# Patient Record
Sex: FEMALE | Race: WHITE | NOT HISPANIC OR LATINO | Employment: UNEMPLOYED | URBAN - NONMETROPOLITAN AREA
[De-identification: names, ages, dates, MRNs, and addresses within clinical notes are randomized per-mention and may not be internally consistent; named-entity substitution may affect disease eponyms.]

---

## 2023-08-03 LAB — URINE CULTURE: ABNORMAL

## 2023-08-25 LAB — URINE CULTURE: NO GROWTH

## 2023-09-09 LAB — URINE CULTURE: ABNORMAL

## 2023-09-25 LAB
APPEARANCE, URINE: NORMAL
BILIRUBIN, URINE: NEGATIVE
BLOOD, URINE: NEGATIVE
COLOR, URINE: YELLOW
GLUCOSE, URINE: NEGATIVE MG/DL
KETONES, URINE: NEGATIVE MG/DL
LEUKOCYTE ESTERASE, URINE: NEGATIVE
NITRITE, URINE: NEGATIVE
PH, URINE: 5 (ref 5–8)
PROTEIN, URINE: NEGATIVE MG/DL
SPECIFIC GRAVITY, URINE: 1.01 (ref 1–1.03)
UROBILINOGEN, URINE: <2 MG/DL (ref 0–1.9)

## 2023-09-26 LAB — URINE CULTURE: NORMAL

## 2023-10-12 ENCOUNTER — OFFICE VISIT (OUTPATIENT)
Dept: PRIMARY CARE | Facility: CLINIC | Age: 73
End: 2023-10-12
Payer: MEDICARE

## 2023-10-12 VITALS
WEIGHT: 116 LBS | HEART RATE: 78 BPM | BODY MASS INDEX: 20.55 KG/M2 | TEMPERATURE: 97.1 F | SYSTOLIC BLOOD PRESSURE: 138 MMHG | DIASTOLIC BLOOD PRESSURE: 80 MMHG | OXYGEN SATURATION: 98 %

## 2023-10-12 DIAGNOSIS — M80.08XA AGE-RELATED OSTEOPOROSIS WITH CURRENT PATHOLOGICAL FRACTURE, VERTEBRA(E), INITIAL ENCOUNTER FOR FRACTURE (MULTI): ICD-10-CM

## 2023-10-12 DIAGNOSIS — I10 PRIMARY HYPERTENSION: ICD-10-CM

## 2023-10-12 DIAGNOSIS — F41.9 ANXIETY: ICD-10-CM

## 2023-10-12 DIAGNOSIS — R79.9 ABNORMAL FINDING OF BLOOD CHEMISTRY, UNSPECIFIED: ICD-10-CM

## 2023-10-12 DIAGNOSIS — Z12.31 SCREENING MAMMOGRAM, ENCOUNTER FOR: ICD-10-CM

## 2023-10-12 DIAGNOSIS — Z00.00 HEALTHCARE MAINTENANCE: Primary | ICD-10-CM

## 2023-10-12 DIAGNOSIS — E55.9 VITAMIN D DEFICIENCY, UNSPECIFIED: ICD-10-CM

## 2023-10-12 DIAGNOSIS — Z13.820 SCREENING FOR OSTEOPOROSIS: ICD-10-CM

## 2023-10-12 PROBLEM — E78.5 HYPERLIPIDEMIA: Status: ACTIVE | Noted: 2023-10-12

## 2023-10-12 PROBLEM — K80.20 CHOLELITHIASIS WITHOUT OBSTRUCTION: Status: ACTIVE | Noted: 2023-10-12

## 2023-10-12 PROBLEM — N81.11 MIDLINE CYSTOCELE: Status: ACTIVE | Noted: 2023-10-12

## 2023-10-12 PROBLEM — M81.0 AGE-RELATED OSTEOPOROSIS WITHOUT CURRENT PATHOLOGICAL FRACTURE: Status: ACTIVE | Noted: 2023-10-12

## 2023-10-12 PROBLEM — M16.11 ARTHRITIS OF RIGHT HIP: Status: ACTIVE | Noted: 2023-10-12

## 2023-10-12 PROBLEM — F51.04 PSYCHOPHYSIOLOGICAL INSOMNIA: Status: ACTIVE | Noted: 2023-10-12

## 2023-10-12 PROBLEM — H81.09 LABYRINTHINE VERTIGO: Status: ACTIVE | Noted: 2017-02-02

## 2023-10-12 PROBLEM — G56.03 BILATERAL CARPAL TUNNEL SYNDROME: Status: ACTIVE | Noted: 2019-01-03

## 2023-10-12 PROBLEM — E03.8 SUBCLINICAL HYPOTHYROIDISM: Status: ACTIVE | Noted: 2023-10-12

## 2023-10-12 PROBLEM — M47.812 CERVICAL SPONDYLOSIS WITHOUT MYELOPATHY: Status: ACTIVE | Noted: 2019-01-03

## 2023-10-12 PROBLEM — R26.9 ABNORMALITY OF GAIT: Status: ACTIVE | Noted: 2017-10-09

## 2023-10-12 PROCEDURE — 80053 COMPREHEN METABOLIC PANEL: CPT

## 2023-10-12 PROCEDURE — 1036F TOBACCO NON-USER: CPT

## 2023-10-12 PROCEDURE — 80061 LIPID PANEL: CPT

## 2023-10-12 PROCEDURE — 85025 COMPLETE CBC W/AUTO DIFF WBC: CPT

## 2023-10-12 PROCEDURE — 3079F DIAST BP 80-89 MM HG: CPT

## 2023-10-12 PROCEDURE — 87389 HIV-1 AG W/HIV-1&-2 AB AG IA: CPT

## 2023-10-12 PROCEDURE — 99387 INIT PM E/M NEW PAT 65+ YRS: CPT

## 2023-10-12 PROCEDURE — 3075F SYST BP GE 130 - 139MM HG: CPT

## 2023-10-12 PROCEDURE — 1160F RVW MEDS BY RX/DR IN RCRD: CPT

## 2023-10-12 PROCEDURE — 36415 COLL VENOUS BLD VENIPUNCTURE: CPT

## 2023-10-12 PROCEDURE — 1159F MED LIST DOCD IN RCRD: CPT

## 2023-10-12 RX ORDER — OXYBUTYNIN CHLORIDE 5 MG/1
5 TABLET ORAL 2 TIMES DAILY
COMMUNITY
Start: 2010-07-14 | End: 2023-10-13

## 2023-10-12 RX ORDER — IBUPROFEN 600 MG/1
600 TABLET ORAL EVERY 6 HOURS PRN
COMMUNITY
Start: 2023-05-10 | End: 2023-11-01 | Stop reason: ALTCHOICE

## 2023-10-12 RX ORDER — LISINOPRIL 10 MG/1
10 TABLET ORAL DAILY
Qty: 90 TABLET | Refills: 1 | Status: SHIPPED | OUTPATIENT
Start: 2023-10-12 | End: 2024-04-09

## 2023-10-12 RX ORDER — BUPROPION HYDROCHLORIDE 300 MG/1
300 TABLET ORAL EVERY MORNING
Qty: 90 TABLET | Refills: 1 | Status: SHIPPED | OUTPATIENT
Start: 2023-10-12 | End: 2024-04-09

## 2023-10-12 RX ORDER — FLUTICASONE PROPIONATE 50 MCG
SPRAY, SUSPENSION (ML) NASAL
COMMUNITY
End: 2023-11-01 | Stop reason: ALTCHOICE

## 2023-10-12 RX ORDER — VIT A/VIT C/VIT E/ZINC/COPPER 4296-226
CAPSULE ORAL
COMMUNITY

## 2023-10-12 RX ORDER — LISINOPRIL 10 MG/1
10 TABLET ORAL DAILY
COMMUNITY
Start: 2018-09-18 | End: 2023-10-12 | Stop reason: SDUPTHER

## 2023-10-12 RX ORDER — OXYCODONE HYDROCHLORIDE 5 MG/1
5 TABLET ORAL EVERY 4 HOURS PRN
COMMUNITY
Start: 2023-05-10 | End: 2023-11-01 | Stop reason: ALTCHOICE

## 2023-10-12 RX ORDER — PSYLLIUM HUSK 100 %
POWDER (GRAM) MISCELLANEOUS
COMMUNITY
End: 2023-11-01 | Stop reason: ALTCHOICE

## 2023-10-12 RX ORDER — CLOBETASOL PROPIONATE 0.46 MG/ML
SOLUTION TOPICAL
COMMUNITY
Start: 2016-12-02 | End: 2023-11-01 | Stop reason: ALTCHOICE

## 2023-10-12 RX ORDER — ESTRADIOL 0.1 MG/G
CREAM VAGINAL
COMMUNITY
Start: 2018-09-18 | End: 2023-11-01 | Stop reason: ALTCHOICE

## 2023-10-12 RX ORDER — NITROFURANTOIN 25; 75 MG/1; MG/1
100 CAPSULE ORAL EVERY 12 HOURS
COMMUNITY
Start: 2023-07-11 | End: 2023-10-13 | Stop reason: ALTCHOICE

## 2023-10-12 RX ORDER — NAPROXEN SODIUM 220 MG
220 TABLET ORAL EVERY 12 HOURS PRN
COMMUNITY
Start: 2017-01-26

## 2023-10-12 RX ORDER — IBUPROFEN 100 MG/5ML
1000 SUSPENSION, ORAL (FINAL DOSE FORM) ORAL
COMMUNITY

## 2023-10-12 RX ORDER — NAPROXEN SODIUM 220 MG/1
TABLET, FILM COATED ORAL
COMMUNITY
End: 2023-11-01 | Stop reason: ALTCHOICE

## 2023-10-12 RX ORDER — FLUCONAZOLE 150 MG/1
TABLET ORAL
COMMUNITY
Start: 2023-07-28 | End: 2023-10-13 | Stop reason: ALTCHOICE

## 2023-10-12 RX ORDER — MULTIVITAMIN
1 TABLET ORAL DAILY
COMMUNITY
Start: 2007-12-19

## 2023-10-12 RX ORDER — MECLIZINE HYDROCHLORIDE 25 MG/1
12.5-25 TABLET ORAL EVERY 6 HOURS PRN
COMMUNITY
Start: 2017-11-09 | End: 2023-11-01 | Stop reason: ALTCHOICE

## 2023-10-12 RX ORDER — LEVOFLOXACIN 750 MG/1
750 TABLET ORAL DAILY
COMMUNITY
Start: 2023-07-28 | End: 2023-10-13 | Stop reason: ALTCHOICE

## 2023-10-12 RX ORDER — BUPROPION HYDROCHLORIDE 300 MG/1
300 TABLET ORAL EVERY MORNING
COMMUNITY
Start: 2018-09-18 | End: 2023-10-12 | Stop reason: SDUPTHER

## 2023-10-12 ASSESSMENT — ENCOUNTER SYMPTOMS
ALLERGIC/IMMUNOLOGIC NEGATIVE: 1
MUSCULOSKELETAL NEGATIVE: 1
DIZZINESS: 0
UNEXPECTED WEIGHT CHANGE: 0
PSYCHIATRIC NEGATIVE: 1
HEADACHES: 0
CARDIOVASCULAR NEGATIVE: 1
RESPIRATORY NEGATIVE: 1
FEVER: 0
SHORTNESS OF BREATH: 0
ACTIVITY CHANGE: 0
ABDOMINAL PAIN: 0
WEAKNESS: 0
FATIGUE: 0
GASTROINTESTINAL NEGATIVE: 1
ENDOCRINE NEGATIVE: 1

## 2023-10-12 NOTE — PATIENT INSTRUCTIONS
Refills sent  See yearly    Mammogram, Bone Density screening  Colonoscopy in the coming years    Thank you for coming in today, if any questions or concerns arise, please call my office.   ALICE Mckeon-CNP

## 2023-10-12 NOTE — PROGRESS NOTES
Subjective   Patient ID: Sandra Jaime is a 73 y.o. female who presents for [facility] (Sandra is here to [facility]. No concerns. ).  Yearly wellness, establish care    Medication refills, currently on lisinopril and wellbutrin  She is caring for her  [place], currently in hospice care    Stressors in life, BP is stable.         Vitals:    10/12/23 1405   BP: 138/80   Pulse: 78   Temp: 36.2 °C (97.1 °F)   SpO2: 98%       Review of Systems   Constitutional:  Negative for activity change, fatigue, fever and unexpected weight change.   HENT: Negative.     Respiratory: Negative.  Negative for shortness of breath.    Cardiovascular: Negative.  Negative for chest pain.   Gastrointestinal: Negative.  Negative for abdominal pain.   Endocrine: Negative.    Musculoskeletal: Negative.    Skin: Negative.    Allergic/Immunologic: Negative.    Neurological:  Negative for dizziness, weakness and headaches.   Psychiatric/Behavioral: Negative.         Objective   Physical Exam  Vitals and nursing note reviewed.   Constitutional:       Appearance: Normal appearance.   HENT:      Head: Normocephalic.      Mouth/Throat:      Mouth: Mucous membranes are moist.   Cardiovascular:      Rate and Rhythm: Normal rate and regular rhythm.      Pulses: Normal pulses.      Heart sounds: Normal heart sounds. No murmur heard.     No friction rub. No gallop.   Pulmonary:      Effort: Pulmonary effort is normal. No respiratory distress.      Breath sounds: Normal breath sounds. No wheezing.   Abdominal:      General: Bowel sounds are normal. There is no distension.      Palpations: Abdomen is soft.      Tenderness: There is no abdominal tenderness.   Musculoskeletal:         General: No deformity. Normal range of motion.   Skin:     General: Skin is warm and dry.      Capillary Refill: Capillary refill takes less than 2 seconds.   Neurological:      General: No focal deficit present.      Mental Status: She is alert and oriented to  person, place, and time.   Psychiatric:         Mood and Affect: Mood normal.         Assessment/Plan   Problem List Items Addressed This Visit    None  Visit Diagnoses       Healthcare maintenance    -  Primary    Relevant Orders    Hepatitis C Antibody    HIV 1/2 Antigen/Antibody Screen with Reflex to Confirmation    Vitamin D 25-Hydroxy,Total (for eval of Vitamin D levels)    CBC and Auto Differential    Comprehensive Metabolic Panel    Lipid Panel    TSH with reflex to Free T4 if abnormal    Screening mammogram, encounter for        Relevant Orders    BI mammo bilateral screening tomosynthesis    Primary hypertension        Relevant Medications    lisinopril 10 mg tablet    Anxiety        Relevant Medications    buPROPion XL (Wellbutrin XL) 300 mg 24 hr tablet    Vitamin D deficiency, unspecified        Relevant Orders    Vitamin D 25-Hydroxy,Total (for eval of Vitamin D levels)    Abnormal finding of blood chemistry, unspecified        Relevant Orders    CBC and Auto Differential    Screening for osteoporosis        Relevant Orders    XR DEXA bone density    Age-related osteoporosis with current pathological fracture, vertebra(e), initial encounter for fracture (CMS/[facility])        Relevant Orders    XR DEXA bone density                 Thank you for coming in today, please call my office if you have any concerns or questions.     Nj JENKINS, CNP

## 2023-10-13 ENCOUNTER — PROCEDURE VISIT (OUTPATIENT)
Dept: UROLOGY | Facility: CLINIC | Age: 73
End: 2023-10-13
Payer: MEDICARE

## 2023-10-13 DIAGNOSIS — N81.11 MIDLINE CYSTOCELE: ICD-10-CM

## 2023-10-13 DIAGNOSIS — N39.41 URGE URINARY INCONTINENCE: Primary | ICD-10-CM

## 2023-10-13 DIAGNOSIS — N81.89 PELVIC FLOOR WEAKNESS: ICD-10-CM

## 2023-10-13 LAB
ALBUMIN SERPL BCP-MCNC: 4.5 G/DL (ref 3.4–5)
ALP SERPL-CCNC: 96 U/L (ref 33–136)
ALT SERPL W P-5'-P-CCNC: 18 U/L (ref 7–45)
ANION GAP SERPL CALC-SCNC: 13 MMOL/L (ref 10–20)
AST SERPL W P-5'-P-CCNC: 19 U/L (ref 9–39)
BASOPHILS # BLD AUTO: 0.06 X10*3/UL (ref 0–0.1)
BASOPHILS NFR BLD AUTO: 1 %
BILIRUB SERPL-MCNC: 0.5 MG/DL (ref 0–1.2)
BUN SERPL-MCNC: 23 MG/DL (ref 6–23)
CALCIUM SERPL-MCNC: 10.2 MG/DL (ref 8.6–10.6)
CHLORIDE SERPL-SCNC: 103 MMOL/L (ref 98–107)
CHOLEST SERPL-MCNC: 203 MG/DL (ref 0–199)
CHOLESTEROL/HDL RATIO: 3.6
CO2 SERPL-SCNC: 30 MMOL/L (ref 21–32)
CREAT SERPL-MCNC: 0.83 MG/DL (ref 0.5–1.05)
EOSINOPHIL # BLD AUTO: 0.06 X10*3/UL (ref 0–0.4)
EOSINOPHIL NFR BLD AUTO: 1 %
ERYTHROCYTE [DISTWIDTH] IN BLOOD BY AUTOMATED COUNT: 12 % (ref 11.5–14.5)
GFR SERPL CREATININE-BSD FRML MDRD: 75 ML/MIN/1.73M*2
GLUCOSE SERPL-MCNC: 90 MG/DL (ref 74–99)
HCT VFR BLD AUTO: 42.5 % (ref 36–46)
HDLC SERPL-MCNC: 56.3 MG/DL
HGB BLD-MCNC: 13.6 G/DL (ref 12–16)
HIV 1+2 AB+HIV1 P24 AG SERPL QL IA: NONREACTIVE
IMM GRANULOCYTES # BLD AUTO: 0.01 X10*3/UL (ref 0–0.5)
IMM GRANULOCYTES NFR BLD AUTO: 0.2 % (ref 0–0.9)
LDLC SERPL CALC-MCNC: 112 MG/DL
LYMPHOCYTES # BLD AUTO: 2.01 X10*3/UL (ref 0.8–3)
LYMPHOCYTES NFR BLD AUTO: 32.3 %
MCH RBC QN AUTO: 30 PG (ref 26–34)
MCHC RBC AUTO-ENTMCNC: 32 G/DL (ref 32–36)
MCV RBC AUTO: 94 FL (ref 80–100)
MONOCYTES # BLD AUTO: 0.57 X10*3/UL (ref 0.05–0.8)
MONOCYTES NFR BLD AUTO: 9.1 %
NEUTROPHILS # BLD AUTO: 3.52 X10*3/UL (ref 1.6–5.5)
NEUTROPHILS NFR BLD AUTO: 56.4 %
NON HDL CHOLESTEROL: 147 MG/DL (ref 0–149)
NRBC BLD-RTO: 0 /100 WBCS (ref 0–0)
PLATELET # BLD AUTO: 266 X10*3/UL (ref 150–450)
PMV BLD AUTO: 9.7 FL (ref 7.5–11.5)
POTASSIUM SERPL-SCNC: 4.8 MMOL/L (ref 3.5–5.3)
PROT SERPL-MCNC: 6.8 G/DL (ref 6.4–8.2)
RBC # BLD AUTO: 4.54 X10*6/UL (ref 4–5.2)
SODIUM SERPL-SCNC: 141 MMOL/L (ref 136–145)
TRIGL SERPL-MCNC: 173 MG/DL (ref 0–149)
VLDL: 35 MG/DL (ref 0–40)
WBC # BLD AUTO: 6.2 X10*3/UL (ref 4.4–11.3)

## 2023-10-13 PROCEDURE — 64561 IMPLANT NEUROELECTRODES: CPT | Performed by: OBSTETRICS & GYNECOLOGY

## 2023-10-13 PROCEDURE — 99213 OFFICE O/P EST LOW 20 MIN: CPT | Performed by: OBSTETRICS & GYNECOLOGY

## 2023-10-13 PROCEDURE — 64561 IMPLANT NEUROELECTRODES: CPT | Mod: 50,MUE | Performed by: OBSTETRICS & GYNECOLOGY

## 2023-10-13 NOTE — PROGRESS NOTES
Subjective   Patient ID: Sandra Jaime is a 73 y.o. 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    She has no other complaints.         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     The patient completed 7 days of Levaquin for her most recent UTI, however she continues to note worsening urinary urgency and frequency. She notes 2-3 episodes of nocturia but denies any enuresis. She voids every 1-3 hours during the day with terminal incontinence on the way to the bathroom. She states she does not have a sensation of complete bladder emptying and has to double void. She denies leaking on laughing, coughing and sneezing. She also notes abdominal pain since this morning.      She denies any vaginal complaints, no abnormal vaginal bleeding or discharge. She is utilizing the fluconazole for her yeast vaginitis.     She denies any bowel related complaints, no fecal or flatal incontinence.     She has no other complaints.        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 She reports she did take the antibiotic which was prescribed after her most recent Botox injection treatment. Patient does have urgency and frequency bladder symptoms with a strong urine odor.      She denies any bowel related complaints, no fecal or flatal incontinence.     She has no other complaints.        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 She has stopped taking her Oxybutynin.      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     The patient presents to discuss her UDS test results, which showed that she does leak with laughing, coughing and sneezing but the leak is more associated with spasticity than the anatomy. She does continue to note bothersome urinary incontinence associated with moving, lifting. She denies any UTI like symptoms.  Third line therapy options including intradetrusor Botox were discussed.     She is heading to [facility] in approximately a month.      She denies any bowel related complaints, no fecal or flatal incontinence.     She denies any vaginal complaints, no abnormal vaginal bleeding or discharge.     She has no other complaints.      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     Patient denies any vaginal complaints. She denies any abnormal vaginal bleeding or discharge.     Unfortunately she does continue to note bothersome urinary incontinence associated with moving, lifting. She denies any significant urge incontinence. She denies any nocturia.     She has no other complaints.     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     The patient presents with complaints of urinary incontinence, she states she leaks on movement and constantly wet all the time and is utilizing pads to avoid accidents. She notes leaking on laughing, cough or sneezing.She denies any UTI like symptoms.      She denies any vaginal complaints, she continues to note some vaginal spotting but denies any abnormal vaginal discharge.      She denies any bowel related complaints, no fecal or flatal incontinence.     She has no other complaints.     From previous note  72-year-old patient presenting as a referral from Dr. Boss with complaints of urinary frequency, urgency and pelvic organ  prolapse.      The patient notes a bulge, she is noting its worsening for the past two weeks. She is not sexually active but denies any vaginal complaints, no abnormal vaginal bleeding or discharge. She underwent hysterectomy due to fibroids a few years ago.      She also reports of l urinary urgency and frequency for the past few weeks, she notes 2-3 episodes of nocturia but denies enuresis. She notes daytime urgency and frequency every 1-2 hours. She has been on oxybutynin for many years, immediate release, and has noted benefits with her lower urinary tract symptoms until roughly 2 weeks ago.. She is experiencing constipation with this medication. She denies leaking on laughing, cough or sneezing. She denies any urge related incontinence complaints. She has had UTI s in the past diagnosed and treated through [facility]. She denies any history of nephrolithiasis, gross hematuria or chronic recurrent UTIs.      She has constipation and utilizes psyllium husks on an as needed basis. She denies any fecal or flatal incontinence.     She has no other complaints.    Review of Systems  Constitutional: No fever, No chills and No fatigue.   Eyes: No vision problems and No dryness of the eyes.   ENT: No dry mouth, No hearing loss and No nosebleeds.   Cardiovascular: No chest pain, No palpitations and No orthopnea.   Respiratory: No shortness of breath, No cough and No wheezing.   Gastrointestinal: No abdominal pain, No constipation, No nausea, No diarrhea, No vomiting and No melena.   Genitourinary: As noted in HPI.   Musculoskeletal: No back pain, No myalgias, No muscle weakness, No joint swelling and No leg edema.   Integumentary: No rashes, No skin lesion and No itching.   Neurological: No headache, No numbness and No dizziness.   Psychiatric: No sleep disturbances, No anxiety and No depression.   Endocrine: No hot flashes, No loss of hair and No hirsutism.   Hematologic/Lymphatic: No swollen glands, No tendency for  "easy bleeding and No tendency for easy bruising.   All other systems have been reviewed and are negative for complaint.        Objective   Physical Exam    FPMRS Procedure: Interstim PNE.     The patient was identified and placed in prone position. Pillows were placed under lower abdomen to flatten the sacrum and under the shins to allow the toes to dangle freely. A ground pad was placed on the bottom of the patient’s foot and the long test stimulation cable was connected to the ground pad and the external test stimulator. The patient was prepped in usual sterile fashion. The sciatic notches and sacral midline were identified via palpation. The level of S3 was identified by measuring 9cm from the tip of the coccyx.     Local injection of was administered at the foramen needle entry point located 2cm lateral to the sacral midline and 2cm cephalad of the sciatic notch level. A 3.5\" foramen needle was introduced at an approximate 60 degree angle, feeling for the foraminal margins until S3 was identified. Proper needle position was confirmed by the patient identifying location of stimulation sensation; and direct observation of the lifting of the perineum or “bellowing,” and plantar flexion of the great toe utilizing the mini-hook patient cable and the external test stimulator.     The foramen needle stylet was removed and a percutaneous lead was inserted to proper depth identified by markers on the lead. The lead was tested by connecting the mini-hook patient cable to the proximal lead electrode and the external test stimulator. Upon response confirmation, the foramen needle was removed by sliding over the percutaneous lead. The foramen needle and lead stylet were removed while securing lead location. Retesting to confirm appropriate lead response was completed.   The above procedure was performed again on the contralateral side.     The leads were connected to the patient cable. Patient was cleaned and external " cabling was secured by covering with transparent dressing. Estimated blood loss was minimal.    Post Procedure:     The patient was programmed with the external test stimulator to optimum sensation via the lead and provided utilization instructions prior to discharge. Patient will complete a voiding diary during testing period to help document results of this procedure.      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     1. Uncomplicated 100 units Botox 7/10/2023. Unfortunately this was complicated by a UTI on office UA 7/28/2023. She has not noted any significant benefits with the Botox after appropriate treatment of her UTI. The patient has noted significant urinary incontinence following her surgery. We have previously discussed the patient's urodynamics noting positive stress urinary incontinence with a leak point pressure of 60 cm of water but with significant detrusor overactivity and spasticity. She emptied to completion and there was no evidence of detrusor sphincter dyssynergia. We have previously discussed these findings and the mixed nature of her disease. We discussed that it appears that her urge urinary incontinence is significantly worsening her stress incontinence complaints. She has stopped her oxybutynin as this was not benefiting her lower urinary tract complaints. She has not had the opportunity to follow-up with the pelvic floor physical therapist.  Uncomplicated office PNE today.    2. As above, we discussed the patient's weak and painful pelvic floor and how  this relates to her lower urinary tract and vaginal complaints. She was previously provided a referral and list of pelvic floor physical therapist. She has not had the opportunity to follow-up to date..     3. We again discussed the patient's vaginal atrophy and how this affects her lower urinary tract and vaginal complaints. We discussed the safety, efficacy, proper utilization of vaginal estrogen therapy. She will continue this 3 times a week moving forward     5. The patient is overall satisfied with her surgery. She has no vaginal complaints.     6. The patient will follow-up in 1 week to discuss her PNE.     ESTELA Harmon MD     Scribe Attestation  By signing my name below, I, Sarah Pratt attest that this documentation has been prepared under the direction and in the presence of Rashaad Harmon MD. All medical record entries made by the Scribe were at my direction or personally dictated by me. I have reviewed the chart and agree that the record accurately reflects my personal performance of the history, physical exam, discussion and plan.     Yes - the patient is able to be screened

## 2023-10-20 ENCOUNTER — OFFICE VISIT (OUTPATIENT)
Dept: UROLOGY | Facility: CLINIC | Age: 73
End: 2023-10-20
Payer: MEDICARE

## 2023-10-20 DIAGNOSIS — N81.89 PELVIC FLOOR WEAKNESS: ICD-10-CM

## 2023-10-20 DIAGNOSIS — N39.41 URGE URINARY INCONTINENCE: Primary | ICD-10-CM

## 2023-10-20 DIAGNOSIS — R10.2 PELVIC PAIN: ICD-10-CM

## 2023-10-20 PROCEDURE — 99213 OFFICE O/P EST LOW 20 MIN: CPT | Performed by: OBSTETRICS & GYNECOLOGY

## 2023-10-20 PROCEDURE — 1159F MED LIST DOCD IN RCRD: CPT | Performed by: OBSTETRICS & GYNECOLOGY

## 2023-10-20 PROCEDURE — 1160F RVW MEDS BY RX/DR IN RCRD: CPT | Performed by: OBSTETRICS & GYNECOLOGY

## 2023-10-20 PROCEDURE — 1036F TOBACCO NON-USER: CPT | Performed by: OBSTETRICS & GYNECOLOGY

## 2023-10-20 PROCEDURE — 99024 POSTOP FOLLOW-UP VISIT: CPT | Performed by: OBSTETRICS & GYNECOLOGY

## 2023-10-20 RX ORDER — SODIUM CHLORIDE, SODIUM LACTATE, POTASSIUM CHLORIDE, CALCIUM CHLORIDE 600; 310; 30; 20 MG/100ML; MG/100ML; MG/100ML; MG/100ML
100 INJECTION, SOLUTION INTRAVENOUS CONTINUOUS
Status: CANCELLED | OUTPATIENT
Start: 2023-10-20

## 2023-10-20 NOTE — H&P (VIEW-ONLY)
Subjective   Patient ID: Sandra Jaime is a 73 y.o. 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    The patient did note significant benefits with the office PNE.  She noted decreased urgency and frequency and incontinence episodes.  She was overall very satisfied with the trial and wishes to proceed with combined stage I/II InterStim.    She is under a great amount of stress as her  is in hospice.     She denies any vaginal complaints, no abnormal bleeding or discharge.     She denies any bowel related complaints, no fecal or flatal incontinence.    She has no other complaints.    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     She has no other complaints.          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     The patient completed 7 days of Levaquin for her most recent UTI, however she continues to note worsening urinary urgency and frequency. She notes 2-3 episodes of nocturia but denies any enuresis. She voids every 1-3 hours during the day with terminal incontinence on the way to the bathroom. She states she does not have a sensation of complete bladder emptying and has to double void. She denies leaking on laughing, coughing and sneezing. She also notes abdominal pain since this morning.      She denies any vaginal complaints, no abnormal vaginal bleeding or discharge. She is utilizing the fluconazole for her yeast vaginitis.     She denies any bowel related complaints, no fecal or flatal incontinence.     She has no other complaints.        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 She reports she did take the antibiotic which was prescribed after her most recent Botox injection treatment. Patient does have urgency and frequency bladder symptoms with a strong urine odor.      She denies any bowel related complaints, no fecal or flatal incontinence.     She has no other complaints.        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 She has stopped taking her Oxybutynin.      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     The patient presents to discuss her UDS test results, which showed that she does leak with laughing, coughing and sneezing but the leak is more associated with spasticity than the anatomy. She does continue to note bothersome urinary incontinence associated with moving, lifting. She denies any UTI like symptoms. Third line therapy options including intradetrusor Botox were discussed.     She is heading to [facility] in approximately a month.      She denies any bowel related complaints, no fecal or flatal incontinence.     She denies any vaginal complaints, no abnormal vaginal bleeding or discharge.     She has no other complaints.      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     Patient denies any vaginal complaints. She denies any abnormal vaginal bleeding or discharge.     Unfortunately she does continue to note bothersome urinary incontinence associated with moving, lifting. She denies any significant urge incontinence. She denies any nocturia.      She has no other complaints.     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     The patient presents with complaints of urinary incontinence, she states she leaks on movement and constantly wet all the time and is utilizing pads to avoid accidents. She notes leaking on laughing, cough or sneezing.She denies any UTI like symptoms.      She denies any vaginal complaints, she continues to note some vaginal spotting but denies any abnormal vaginal discharge.      She denies any bowel related complaints, no fecal or flatal incontinence.     She has no other complaints.     From previous note  72-year-old patient presenting as a referral from Dr. Boss with complaints of urinary frequency, urgency and pelvic organ prolapse.      The patient notes a bulge, she is noting its worsening for the past two weeks. She is not sexually active but denies any vaginal complaints, no abnormal vaginal bleeding or discharge. She underwent hysterectomy due to fibroids a few years ago.      She also reports of l urinary urgency and frequency for the past few weeks, she notes 2-3 episodes of nocturia but denies enuresis. She notes daytime urgency and frequency every 1-2 hours. She has been on oxybutynin for many years, immediate release, and has noted benefits with her lower urinary tract symptoms until roughly 2 weeks ago.. She is experiencing constipation with this medication. She denies leaking on laughing, cough or sneezing. She denies any urge related incontinence complaints. She has had UTI s in the past diagnosed and treated through [facility]. She denies any history of nephrolithiasis, gross hematuria or chronic recurrent UTIs.      She has constipation and utilizes psyllium  husks on an as needed basis. She denies any fecal or flatal incontinence.     She has no other complaints.  Review of Systems  Constitutional: No fever, No chills and No fatigue.   Eyes: No vision problems and No dryness of the eyes.   ENT: No dry mouth, No hearing loss and No nosebleeds.   Cardiovascular: No chest pain, No palpitations and No orthopnea.   Respiratory: No shortness of breath, No cough and No wheezing.   Gastrointestinal: No abdominal pain, No constipation, No nausea, No diarrhea, No vomiting and No melena.   Genitourinary: As noted in HPI.   Musculoskeletal: No back pain, No myalgias, No muscle weakness, No joint swelling and No leg edema.   Integumentary: No rashes, No skin lesion and No itching.   Neurological: No headache, No numbness and No dizziness.   Psychiatric: No sleep disturbances, No anxiety and No depression.   Endocrine: No hot flashes, No loss of hair and No hirsutism.   Hematologic/Lymphatic: No swollen glands, No tendency for easy bleeding and No tendency for easy bruising.   All other systems have been reviewed and are negative for complaint.        Objective   Physical Exam    The bilateral PNE leads were removed with gentle traction in their entirety.  No bleeding was noted or were there any signs or symptoms of infection.    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     1.  The patient did not have any significant benefits following her 100 units Botox 7/10/2023. This was complicated by a UTI on office UA 7/28/2023. She has not  noted any significant benefits with the Botox after appropriate treatment of her UTI. The patient has noted significant urinary incontinence following her surgery. We have previously discussed the patient's urodynamics noting positive stress urinary incontinence with a leak point pressure of 60 cm of water but with significant detrusor overactivity and spasticity. She emptied to completion and there was no evidence of detrusor sphincter dyssynergia. We have previously discussed these findings and the mixed nature of her disease. We discussed that it appears that her urge urinary incontinence is significantly worsening her stress incontinence complaints. She has stopped her oxybutynin as this was not benefiting her lower urinary tract complaints. She has not had the opportunity to follow-up with the pelvic floor physical therapist.  She had significant improvements in her lower urinary tract complaints following her PNE.  She strongly desires to proceed with a combined stage I and II InterStim and will be scheduled at her earliest convenience.     2. As above, we discussed the patient's weak and painful pelvic floor and how this relates to her lower urinary tract and vaginal complaints. She was previously provided a referral and list of pelvic floor physical therapist. She has not had the opportunity to follow-up to date.  She is under a great deal of stress as her  is soon to be transferred to hospice care.     3. We again discussed the patient's vaginal atrophy and how this affects her lower urinary tract and vaginal complaints. We discussed the safety, efficacy, proper utilization of vaginal estrogen therapy. She will continue this 3 times a week moving forward     5. The patient is overall satisfied with her surgery. She has no vaginal complaints.     6. The patient will be scheduled at her earliest convenience for combined stage I and II InterStim.     ESTELA Harmon MD      Scribe Attestation  By  signing my name below, I, Sarah Pratt attest that this documentation has been prepared under the direction and in the presence of Rashaad Harmon MD. All medical record entries made by the Scribe were at my direction or personally dictated by me. I have reviewed the chart and agree that the record accurately reflects my personal performance of the history, physical exam, discussion and plan

## 2023-10-20 NOTE — PROGRESS NOTES
Subjective   Patient ID: Sandra Jaime is a 73 y.o. 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    The patient did note significant benefits with the office PNE.  She noted decreased urgency and frequency and incontinence episodes.  She was overall very satisfied with the trial and wishes to proceed with combined stage I/II InterStim.    She is under a great amount of stress as her  is in hospice.     She denies any vaginal complaints, no abnormal bleeding or discharge.     She denies any bowel related complaints, no fecal or flatal incontinence.    She has no other complaints.    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     She has no other complaints.          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     The patient completed 7 days of Levaquin for her most recent UTI, however she continues to note worsening urinary urgency and frequency. She notes 2-3 episodes of nocturia but denies any enuresis. She voids every 1-3 hours during the day with terminal incontinence on the way to the bathroom. She states she does not have a sensation of complete bladder emptying and has to double void. She denies leaking on laughing, coughing and sneezing. She also notes abdominal pain since this morning.      She denies any vaginal complaints, no abnormal vaginal bleeding or discharge. She is utilizing the fluconazole for her yeast vaginitis.     She denies any bowel related complaints, no fecal or flatal incontinence.     She has no other complaints.        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 She reports she did take the antibiotic which was prescribed after her most recent Botox injection treatment. Patient does have urgency and frequency bladder symptoms with a strong urine odor.      She denies any bowel related complaints, no fecal or flatal incontinence.     She has no other complaints.        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 She has stopped taking her Oxybutynin.      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     The patient presents to discuss her UDS test results, which showed that she does leak with laughing, coughing and sneezing but the leak is more associated with spasticity than the anatomy. She does continue to note bothersome urinary incontinence associated with moving, lifting. She denies any UTI like symptoms. Third line therapy options including intradetrusor Botox were discussed.     She is heading to [facility] in approximately a month.      She denies any bowel related complaints, no fecal or flatal incontinence.     She denies any vaginal complaints, no abnormal vaginal bleeding or discharge.     She has no other complaints.      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     Patient denies any vaginal complaints. She denies any abnormal vaginal bleeding or discharge.     Unfortunately she does continue to note bothersome urinary incontinence associated with moving, lifting. She denies any significant urge incontinence. She denies any nocturia.      She has no other complaints.     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     The patient presents with complaints of urinary incontinence, she states she leaks on movement and constantly wet all the time and is utilizing pads to avoid accidents. She notes leaking on laughing, cough or sneezing.She denies any UTI like symptoms.      She denies any vaginal complaints, she continues to note some vaginal spotting but denies any abnormal vaginal discharge.      She denies any bowel related complaints, no fecal or flatal incontinence.     She has no other complaints.     From previous note  72-year-old patient presenting as a referral from Dr. Boss with complaints of urinary frequency, urgency and pelvic organ prolapse.      The patient notes a bulge, she is noting its worsening for the past two weeks. She is not sexually active but denies any vaginal complaints, no abnormal vaginal bleeding or discharge. She underwent hysterectomy due to fibroids a few years ago.      She also reports of l urinary urgency and frequency for the past few weeks, she notes 2-3 episodes of nocturia but denies enuresis. She notes daytime urgency and frequency every 1-2 hours. She has been on oxybutynin for many years, immediate release, and has noted benefits with her lower urinary tract symptoms until roughly 2 weeks ago.. She is experiencing constipation with this medication. She denies leaking on laughing, cough or sneezing. She denies any urge related incontinence complaints. She has had UTI s in the past diagnosed and treated through [facility]. She denies any history of nephrolithiasis, gross hematuria or chronic recurrent UTIs.      She has constipation and utilizes psyllium  husks on an as needed basis. She denies any fecal or flatal incontinence.     She has no other complaints.  Review of Systems  Constitutional: No fever, No chills and No fatigue.   Eyes: No vision problems and No dryness of the eyes.   ENT: No dry mouth, No hearing loss and No nosebleeds.   Cardiovascular: No chest pain, No palpitations and No orthopnea.   Respiratory: No shortness of breath, No cough and No wheezing.   Gastrointestinal: No abdominal pain, No constipation, No nausea, No diarrhea, No vomiting and No melena.   Genitourinary: As noted in HPI.   Musculoskeletal: No back pain, No myalgias, No muscle weakness, No joint swelling and No leg edema.   Integumentary: No rashes, No skin lesion and No itching.   Neurological: No headache, No numbness and No dizziness.   Psychiatric: No sleep disturbances, No anxiety and No depression.   Endocrine: No hot flashes, No loss of hair and No hirsutism.   Hematologic/Lymphatic: No swollen glands, No tendency for easy bleeding and No tendency for easy bruising.   All other systems have been reviewed and are negative for complaint.        Objective   Physical Exam    The bilateral PNE leads were removed with gentle traction in their entirety.  No bleeding was noted or were there any signs or symptoms of infection.    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     1.  The patient did not have any significant benefits following her 100 units Botox 7/10/2023. This was complicated by a UTI on office UA 7/28/2023. She has not  noted any significant benefits with the Botox after appropriate treatment of her UTI. The patient has noted significant urinary incontinence following her surgery. We have previously discussed the patient's urodynamics noting positive stress urinary incontinence with a leak point pressure of 60 cm of water but with significant detrusor overactivity and spasticity. She emptied to completion and there was no evidence of detrusor sphincter dyssynergia. We have previously discussed these findings and the mixed nature of her disease. We discussed that it appears that her urge urinary incontinence is significantly worsening her stress incontinence complaints. She has stopped her oxybutynin as this was not benefiting her lower urinary tract complaints. She has not had the opportunity to follow-up with the pelvic floor physical therapist.  She had significant improvements in her lower urinary tract complaints following her PNE.  She strongly desires to proceed with a combined stage I and II InterStim and will be scheduled at her earliest convenience.     2. As above, we discussed the patient's weak and painful pelvic floor and how this relates to her lower urinary tract and vaginal complaints. She was previously provided a referral and list of pelvic floor physical therapist. She has not had the opportunity to follow-up to date.  She is under a great deal of stress as her  is soon to be transferred to hospice care.     3. We again discussed the patient's vaginal atrophy and how this affects her lower urinary tract and vaginal complaints. We discussed the safety, efficacy, proper utilization of vaginal estrogen therapy. She will continue this 3 times a week moving forward     5. The patient is overall satisfied with her surgery. She has no vaginal complaints.     6. The patient will be scheduled at her earliest convenience for combined stage I and II InterStim.     ESTELA Harmon MD      Scribe Attestation  By  signing my name below, I, Sarah Pratt attest that this documentation has been prepared under the direction and in the presence of Rashaad Harmon MD. All medical record entries made by the Scribe were at my direction or personally dictated by me. I have reviewed the chart and agree that the record accurately reflects my personal performance of the history, physical exam, discussion and plan

## 2023-10-23 PROBLEM — N39.41 URGE URINARY INCONTINENCE: Status: ACTIVE | Noted: 2023-10-20

## 2023-10-26 ENCOUNTER — APPOINTMENT (OUTPATIENT)
Dept: UROLOGY | Facility: CLINIC | Age: 73
End: 2023-10-26
Payer: MEDICARE

## 2023-11-01 ENCOUNTER — HOSPITAL ENCOUNTER (OUTPATIENT)
Dept: RADIOLOGY | Facility: HOSPITAL | Age: 73
Discharge: HOME | End: 2023-11-01
Payer: MEDICARE

## 2023-11-01 ENCOUNTER — CLINICAL SUPPORT (OUTPATIENT)
Dept: PREADMISSION TESTING | Facility: HOSPITAL | Age: 73
End: 2023-11-01
Payer: MEDICARE

## 2023-11-01 VITALS
OXYGEN SATURATION: 99 % | RESPIRATION RATE: 18 BRPM | HEART RATE: 70 BPM | TEMPERATURE: 97.7 F | HEIGHT: 63 IN | SYSTOLIC BLOOD PRESSURE: 165 MMHG | BODY MASS INDEX: 21.02 KG/M2 | DIASTOLIC BLOOD PRESSURE: 72 MMHG | WEIGHT: 118.61 LBS

## 2023-11-01 DIAGNOSIS — Z01.818 PREOPERATIVE TESTING: Primary | ICD-10-CM

## 2023-11-01 DIAGNOSIS — M80.08XA AGE-RELATED OSTEOPOROSIS WITH CURRENT PATHOLOGICAL FRACTURE, VERTEBRA(E), INITIAL ENCOUNTER FOR FRACTURE (MULTI): ICD-10-CM

## 2023-11-01 DIAGNOSIS — Z13.820 SCREENING FOR OSTEOPOROSIS: ICD-10-CM

## 2023-11-01 LAB
ANION GAP SERPL CALC-SCNC: 10 MMOL/L
APPEARANCE UR: CLEAR
BILIRUB UR STRIP.AUTO-MCNC: NEGATIVE MG/DL
BUN SERPL-MCNC: 27 MG/DL (ref 8–25)
CALCIUM SERPL-MCNC: 9.8 MG/DL (ref 8.5–10.4)
CHLORIDE SERPL-SCNC: 101 MMOL/L (ref 97–107)
CO2 SERPL-SCNC: 27 MMOL/L (ref 24–31)
COLOR UR: NORMAL
CREAT SERPL-MCNC: 0.8 MG/DL (ref 0.4–1.6)
ERYTHROCYTE [DISTWIDTH] IN BLOOD BY AUTOMATED COUNT: 11.9 % (ref 11.5–14.5)
GFR SERPL CREATININE-BSD FRML MDRD: 78 ML/MIN/1.73M*2
GLUCOSE SERPL-MCNC: 113 MG/DL (ref 65–99)
GLUCOSE UR STRIP.AUTO-MCNC: NORMAL MG/DL
HCT VFR BLD AUTO: 40.4 % (ref 36–46)
HGB BLD-MCNC: 13.5 G/DL (ref 12–16)
KETONES UR STRIP.AUTO-MCNC: NEGATIVE MG/DL
LEUKOCYTE ESTERASE UR QL STRIP.AUTO: NEGATIVE
MCH RBC QN AUTO: 30.8 PG (ref 26–34)
MCHC RBC AUTO-ENTMCNC: 33.4 G/DL (ref 32–36)
MCV RBC AUTO: 92 FL (ref 80–100)
NITRITE UR QL STRIP.AUTO: NEGATIVE
NRBC BLD-RTO: 0 /100 WBCS (ref 0–0)
PH UR STRIP.AUTO: 5 [PH]
PLATELET # BLD AUTO: 238 X10*3/UL (ref 150–450)
PMV BLD AUTO: 8.9 FL (ref 7.5–11.5)
POTASSIUM SERPL-SCNC: 4.5 MMOL/L (ref 3.4–5.1)
PROT UR STRIP.AUTO-MCNC: NEGATIVE MG/DL
RBC # BLD AUTO: 4.39 X10*6/UL (ref 4–5.2)
RBC # UR STRIP.AUTO: NEGATIVE /UL
SODIUM SERPL-SCNC: 138 MMOL/L (ref 133–145)
SP GR UR STRIP.AUTO: 1.01
UROBILINOGEN UR STRIP.AUTO-MCNC: NORMAL MG/DL
WBC # BLD AUTO: 8.5 X10*3/UL (ref 4.4–11.3)

## 2023-11-01 PROCEDURE — 81003 URINALYSIS AUTO W/O SCOPE: CPT

## 2023-11-01 PROCEDURE — 87081 CULTURE SCREEN ONLY: CPT | Mod: TRILAB

## 2023-11-01 PROCEDURE — 80051 ELECTROLYTE PANEL: CPT

## 2023-11-01 PROCEDURE — 82310 ASSAY OF CALCIUM: CPT

## 2023-11-01 PROCEDURE — 99213 OFFICE O/P EST LOW 20 MIN: CPT | Performed by: NURSE PRACTITIONER

## 2023-11-01 PROCEDURE — 85027 COMPLETE CBC AUTOMATED: CPT

## 2023-11-01 PROCEDURE — 36415 COLL VENOUS BLD VENIPUNCTURE: CPT

## 2023-11-01 PROCEDURE — 80048 BASIC METABOLIC PNL TOTAL CA: CPT

## 2023-11-01 PROCEDURE — 77085 DXA BONE DENSITY AXL VRT FX: CPT

## 2023-11-01 RX ORDER — CHLORHEXIDINE GLUCONATE ORAL RINSE 1.2 MG/ML
SOLUTION DENTAL
Qty: 473 ML | Refills: 0 | Status: SHIPPED | OUTPATIENT
Start: 2023-11-01

## 2023-11-01 ASSESSMENT — ENCOUNTER SYMPTOMS
EYES NEGATIVE: 1
MUSCULOSKELETAL NEGATIVE: 1
RESPIRATORY NEGATIVE: 1
NEUROLOGICAL NEGATIVE: 1
CARDIOVASCULAR NEGATIVE: 1
GASTROINTESTINAL NEGATIVE: 1
CONSTITUTIONAL NEGATIVE: 1
NECK NEGATIVE: 1

## 2023-11-01 ASSESSMENT — DUKE ACTIVITY SCORE INDEX (DASI)
CAN YOU WALK A BLOCK OR TWO ON LEVEL GROUND: YES
CAN YOU WALK INDOORS, SUCH AS AROUND YOUR HOUSE: YES
CAN YOU HAVE SEXUAL RELATIONS: YES
CAN YOU PARTICIPATE IN STRENOUS SPORTS LIKE SWIMMING, SINGLES TENNIS, FOOTBALL, BASKETBALL, OR SKIING: YES
CAN YOU RUN A SHORT DISTANCE: YES
DASI METS SCORE: 9.9
TOTAL_SCORE: 58.2
CAN YOU TAKE CARE OF YOURSELF (EAT, DRESS, BATHE, OR USE TOILET): YES
CAN YOU DO HEAVY WORK AROUND THE HOUSE LIKE SCRUBBING FLOORS OR LIFTING AND MOVING HEAVY FURNITURE: YES
CAN YOU DO LIGHT WORK AROUND THE HOUSE LIKE DUSTING OR WASHING DISHES: YES
CAN YOU CLIMB A FLIGHT OF STAIRS OR WALK UP A HILL: YES
CAN YOU PARTICIPATE IN MODERATE RECREATIONAL ACTIVITIES LIKE GOLF, BOWLING, DANCING, DOUBLES TENNIS OR THROWING A BASEBALL OR FOOTBALL: YES
CAN YOU DO YARD WORK LIKE RAKING LEAVES, WEEDING OR PUSHING A MOWER: YES
CAN YOU DO MODERATE WORK AROUND THE HOUSE LIKE VACUUMING, SWEEPING FLOORS OR CARRYING GROCERIES: YES

## 2023-11-01 ASSESSMENT — PAIN - FUNCTIONAL ASSESSMENT: PAIN_FUNCTIONAL_ASSESSMENT: 0-10

## 2023-11-01 ASSESSMENT — CHADS2 SCORE
PRIOR STROKE OR TIA OR THROMBOEMBOLISM: NO
CHADS2 SCORE: 1
HYPERTENSION: YES
AGE GREATER THAN OR EQUAL TO 75: NO
CHF: NO
DIABETES: NO

## 2023-11-01 ASSESSMENT — PAIN DESCRIPTION - DESCRIPTORS: DESCRIPTORS: BURNING

## 2023-11-01 ASSESSMENT — PAIN SCALES - GENERAL: PAINLEVEL_OUTOF10: 4

## 2023-11-01 NOTE — CPM/PAT H&P
CPM/PAT Evaluation       Name: Sandra Jaime (Sandra Jaime)  /Age: 1950/73 y.o.     In-Person       Chief Complaint: Urge urinary incontinence    HPI  A 73-year-old female with urge urinary incontinence.  History of urinary urgency, urinary frequency, and leakage of urine for the past several years.  She previously underwent repair of pelvic organ prolapse, but continues to have urinary symptoms.  Denies fever, chills, dysuria, or hematuria.  She is scheduled for insertion continence control stimulator, insertion stimulator peripheral nerve lower extremity, fluoroscopy exam under anesthesia.    Past Medical History:   Diagnosis Date    Anxiety     Awareness under anesthesia     Diverticulosis of intestine, part unspecified, without perforation or abscess without bleeding     Diverticulosis    Essential (primary) hypertension 2016    Benign hypertension    Hyperlipidemia, unspecified     Dyslipidemia    Macular degeneration     Personal history of other diseases of the nervous system and sense organs     History of carpal tunnel syndrome    Personal history of other mental and behavioral disorders     History of depression    Tachycardia     Unspecified urinary incontinence 2016    Urinary incontinence    Vertigo        Past Surgical History:   Procedure Laterality Date    CATARACT EXTRACTION      HYSTERECTOMY  2014    Hysterectomy    RECTAL VAGINAL FISTULECTOMY  2014    Rectocele Repair    TONSILLECTOMY  2014    Tonsillectomy    US GUIDED ASPIRATION INJECTION MAJOR JOINT  2020    US GUIDED ASPIRATION INJECTION MAJOR JOINT 2020 [facility]       Allergies   Allergen Reactions    Prochlorperazine Anxiety, Hallucinations, Other and Unknown    Azithromycin Other     FELT FUNNY    Ciprofloxacin Unknown     Head ache, nausea , heart burn, flush       Current Outpatient Medications   Medication Sig Dispense Refill    ascorbic acid (Vitamin C) 1,000 mg tablet Take 1  tablet (1,000 mg) by mouth once daily.      buPROPion XL (Wellbutrin XL) 300 mg 24 hr tablet Take 1 tablet (300 mg) by mouth once daily in the morning. 90 tablet 1    chlorhexidine (Peridex) 0.12 % solution Use cap to measure 15 mL.  Swish/gargle mouthwash for at least 30 seconds.  Do not swallow.  Use night before surgery after brushing teeth and morning of surgery after brushing teeth. 473 mL 0    KRILL OIL ORAL Take 1 tablet by mouth once daily.      lisinopril 10 mg tablet Take 1 tablet (10 mg) by mouth once daily. 90 tablet 1    multivitamin tablet Take 1 tablet by mouth once daily.      naproxen sodium (Aleve) 220 mg tablet Take 1 tablet (220 mg) by mouth every 12 hours if needed for mild pain (1 - 3).      psyllium (Metamucil) powder Take 1 Dose (5.8 g) by mouth once daily as needed (CONSTIPATION). VIVA FIBER POWDER      QUERCETIN ORAL Take 2 tablets by mouth once daily.      vitamin D3-vitamin K2, MK4, 1,000-100 unit-mcg tablet Take 1,000 Units by mouth once daily.      vitamins A,C,E-zinc-copper (PreserVision AREDS) 4,296 mcg-226 mg-90 mg capsule as directed Orally       No current facility-administered medications for this visit.       PAT ROS:   Constitutional:   neg    Neuro/Psych:   neg    Eyes:   neg    Ears:   neg    Nose:   neg    Mouth:    dentures  neg    Throat:   neg    Neck:   neg    Cardio:   neg    Respiratory:   neg    Endocrine:   GI:   neg    :    polyuria (urinary urgency, frequency, leakage)  Musculoskeletal:   neg    Hematologic:   neg    Skin:      Physical Exam  Vitals (Elevated BP-patient advised to monitor BP and follow-up with PCP) reviewed.   Constitutional:       Appearance: Normal appearance.   HENT:      Head: Normocephalic and atraumatic.      Mouth/Throat:      Mouth: Mucous membranes are moist.   Eyes:      Pupils: Pupils are equal, round, and reactive to light.   Cardiovascular:      Rate and Rhythm: Normal rate and regular rhythm.   Pulmonary:      Effort: Pulmonary  "effort is normal.      Breath sounds: Normal breath sounds.   Abdominal:      Palpations: Abdomen is soft.   Musculoskeletal:         General: Normal range of motion.      Cervical back: Normal range of motion.   Skin:     General: Skin is warm and dry.   Neurological:      Mental Status: She is alert and oriented to person, place, and time.   Psychiatric:         Mood and Affect: Mood normal.          PAT AIRWAY:   Airway:     Mallampati::  II    Neck ROM::  Full  normal     upper dentures      /72   Pulse 70   Temp 36.5 °C (97.7 °F)   Resp 18   Ht 1.6 m (5' 3\")   Wt 53.8 kg (118 lb 9.7 oz)   SpO2 99%   BMI 21.01 kg/m²     STOP BANG 3  CHADS 2 score: 1-2.8%  DASI score: 58.2  METS score: 9.9  Revised cardiac risk index: 0.4%  ASA I  ARISCAT 1.6%    Assessment and Plan:     Urge urinary incontinence Plan: Insertion continence control stimulator, insertion stimulator peripheral nerve lower extremity, fluoroscopy exam under anesthesia.  Hypertension  Anxiety/depression  Macular degeneration        "

## 2023-11-01 NOTE — LETTER
November 9, 2023     Sandra VIRGIE Yeni  [address] [place] [id number]      Dear Ms. Jaime:    Below are the results from your recent visit:    Resulted Orders   XR DEXA bone density axial skeleton w VFA    Narrative    Interpreted By:  Jeramy Russell,   STUDY:  DEXA BONE DENSITY AXIAL SKELETON W VFA11/1/2023 12:17 pm      INDICATION:  Signs/Symptoms:screening. The patient is a 72 y/o  year old F.  Postmenopausal      COMPARISON:  DEXA scan 04/08/2013      ACCESSION NUMBER(S):  [id number]      ORDERING CLINICIAN:  RAYMON APONTE      TECHNIQUE:  DEXA BONE DENSITY AXIAL SKELETON W VFA      FINDINGS:  SPINE L1-L4  Bone Mineral Density: 0.964 g/cm2  T-Score -0.8  Z-Score 1.5  Bone Mineral Density change vs baseline:  2.2 %  Bone Mineral Density change vs previous: -9.1 %      LEFT FEMUR -TOTAL  Bone Mineral Density: 0.923 g/cm2  T-Score -0.2   Z-Score  1.5  Bone Mineral Density change vs baseline: -2.8 %  Bone Mineral Density change vs previous: -2.8 %      LEFT FEMUR -NECK  Bone Mineral Density: 0.804 g/cm2  T-Score -0.4  Z-Score 1.5          World Health Organization (WHO) criteria for post-menopausal,   Women:  Normal:         T-score at or above -1 SD  Osteopenia:   T-score between -1 and -2.5 SD  Osteoporosis: T-score at or below -2.5 SD          10-year Fracture Risk(1):  Major Osteoporotic Fracture  7.8 %  Hip Fracture                        0.7 %      Vertebral Deformity Assessment: Exam Date - 11/1/2023 12:17 pm  Within the limitations of the DEXA scan imaging, there is no gross  thoracolumbar compression deformity identified.        Impression    DEXA:  According to World Health Organization criteria,  classification is normal.      Followup recommended in 2 years or sooner as clinically warranted.      VFA:  Within the limitations of the DEXA scan imaging, there is no  gross thoracolumbar compression deformity identified.      All images and detailed analysis are available on the   Radiology  PACS.      MACRO:  None      Signed by: Jeramy Russell 11/1/2023 12:30 PM  Dictation workstation:   [id number]     The test results show that your current treatment is working.  If you have any questions or concerns, please don't hesitate to call.         Sincerely,    MARY Mensah

## 2023-11-01 NOTE — PREPROCEDURE INSTRUCTIONS
Medication List            Accurate as of November 1, 2023 11:03 AM. Always use your most recent med list.                ascorbic acid 1,000 mg tablet  Commonly known as: Vitamin C  Medication Adjustments for Surgery: Stop 7 days before surgery     buPROPion  mg 24 hr tablet  Commonly known as: Wellbutrin XL  Take 1 tablet (300 mg) by mouth once daily in the morning.  Medication Adjustments for Surgery: Take morning of surgery with sip of water, no other fluids     KRILL OIL ORAL  Medication Adjustments for Surgery: Stop 7 days before surgery     lisinopril 10 mg tablet  Take 1 tablet (10 mg) by mouth once daily.  Medication Adjustments for Surgery: Stop 1 day before surgery     multivitamin tablet  Medication Adjustments for Surgery: Stop 7 days before surgery     naproxen sodium 220 mg tablet  Commonly known as: Aleve  Medication Adjustments for Surgery: Stop 7 days before surgery     PreserVision AREDS 4,296 mcg-226 mg-90 mg capsule  Generic drug: vitamins A,C,E-zinc-copper  Medication Adjustments for Surgery: Stop 7 days before surgery     psyllium powder  Commonly known as: Metamucil  Notes to patient: WHEN NEEDED     QUERCETIN ORAL  Medication Adjustments for Surgery: Stop 7 days before surgery     vitamin D3-vitamin K2 (MK4) 1,000-100 unit-mcg tablet  Medication Adjustments for Surgery: Stop 7 days before surgery                            PAT DISCHARGE INSTRUCTIONS    Please call the Same Day Surgery (SDS) Department of the hospital where your procedure will be performed after 2:00 PM the day before your surgery. If you are scheduled on a Monday, or a Tuesday following a Monday holiday, you will need to call on the last business day prior to your surgery.    [facility]  [id number] [place] [address], [address] [phone]    [address] [address] [phone]    [facility]  [id number] [place] [address].  [place]  [address]  [phone]    Please let your surgeon know if:      You develop any open sores, shingles, burning or painful urination as these may increase your risk of an infection.   You no longer wish to have the surgery.   Any other personal circumstances change that may lead to the need to cancel or defer this surgery-such as being sick or getting admitted to any hospital within one week of your planned procedure.    Your contact details change, such as a change of address or phone number.    Starting now:     Please DO NOT drink alcohol or smoke for 24 hours before surgery. It is well known that quitting smoking can make a huge difference to your health and recovery from surgery. The longer you abstain from smoking, the better your chances of a healthy recovery. If you need help with quitting, call 3-800QUIT-NOW to be connected to a trained counselor who will discuss the best methods to help you quit.     Before your surgery:    Please stop all supplements 7 days prior to surgery. Or as directed by your surgeon.   Please stop taking NSAID pain medicine such as Advil and Motrin 7 days before surgery.    If you develop any fever, cough, cold, rashes, cuts, scratches, scrapes, urinary symptoms or infection anywhere on your body (including teeth and gums) prior to surgery, please call your surgeon’s office as soon as possible. This may require treatment to reduce the chance of cancellation on the day of surgery.    The day before your surgery:   DIET- Do not eat any food after MIDNIGHT. May have 10 ounces of CLEAR LIQUIDS until TWO HOURS before your arrival time. This includes water, black tea or coffee (no milk ir cream), apple juice and electrolyte drinks (Gatorade). May chew gum until TWO hours before your surgery time.   Get a good night’s rest.  Use the special soap for bathing if you have been instructed to use one.    Scheduled surgery times may change and you will be notified if this occurs - please check your  personal voicemail for any updates.     On the morning of surgery:   Wear comfortable, loose fitting clothes which open in the front. Please do not wear moisturizers, creams, lotions, makeup or perfume.    Please bring with you to surgery:   Photo ID and insurance card   Current list of medicines and allergies   Pacemaker/ Defibrillator/Heart stent cards   CPAP machine and mask    Slings/ splints/ crutches   A copy of your complete advanced directive/DHPOA.    Please do NOT bring with you to surgery:   All jewelry and valuables should be left at home.   Prosthetic devices such as contact lenses, hearing aids, dentures, eyelash extensions, hairpins and body piercings must be removed prior to going in to the surgical suite.    After outpatient surgery:   A responsible adult MUST accompany you at the time of discharge and stay with you for 24 hours after your surgery. You may NOT drive yourself home after surgery.    Do not drive, operate machinery, make critical decisions or do activities that require co-ordination or balance until after a night’s sleep.   Do not drink alcoholic beverages for 24 hours.   Instructions for resuming your medications will be provided by your surgeon.    CALL YOUR DOCTOR AFTER SURGERY IF YOU HAVE:     Chills and/or a fever of 101° F or higher.    Redness, swelling, pus or drainage from your surgical wound or a bad smell from the wound.    Lightheadedness, fainting or confusion.    Persistent vomiting (throwing up) and are not able to eat or drink for 12 hours.    Three or more loose, watery bowel movements in 24 hours (diarrhea).   Difficulty or pain while urinating( after non-urological surgery)    Pain and swelling in your legs, especially if it is only on one side.    Difficulty breathing or are breathing faster than normal.    Any new concerning symptoms.     Home Preoperative Antibacterial Shower    What is a home preoperative antibacterial shower?  This shower is a way of cleaning  the skin with a germ killing solution before surgery. The solution contains chlorhexidine, commonly known as CHG. CHG is a skin cleanser with germ killing ability. Let your doctor know if you are allergic to chlorhexidine.      Why do I need to take a preoperative antibacterial shower?  Skin is not sterile. It is best to try to make your skin as free of germs as possible before surgery. Proper cleansing with a germ killing soap before surgery can lower the number of germs on your skin. This helps to reduce the risk of infection at the surgical site. Following the instructions listed below will help you prepare your skin for surgery.    How do I use the solution?      Steps: Begin using your CHG soap FIVE DAYS BEFORE your scheduled surgery on __________________________________________________.  First, wash and rinse your hair using the CHG soap. Keep CHG away soap away from ear canals and eyes.  Rinse completely, do not condition. Hair extensions should be removed.  Wash your face with your normal soap and rinse.  Apply the CHG solution to a clean wet washcloth. Turn the water off or move away from the water spray to avoid premature rinsing of the CHG soap as you are applying.  Firmly lather your entire body from neck down. Do not use on face.  Pay special attention to the areas(s) where your incision(s) will be located unless they are on your face.  Avoid scrubbing your skin too hard.  The important point is to have the CHG soap sit on your skin for 3 minutes.  DO NOT wash with regular soap after you have used the CHG soap solution.  Pat yourself dry with a clean, freshly laundered towel.  DO NOT apply powders, deodorants or lotions.  Dress in clean, freshly laundered night clothes.  Be sure to sleep with clean, freshly laundered sheets.  Be aware that CHG will cause stains on fabrics; if you wash them with bleach after use. Rinse your washcloth and other linens that have contact with CHG completely. Use only  non-chlorine detergents to launder the items used.  The morning of surgery is the fifth day. Repeat the above steps and dress in clean comfortable clothing.      Who should I call if I have any questions regarding the use of CHG soap?  Call the [facility] at [phone] if you have any questions.       Patient Information: Oral/Dental Rinse    What is oral/dental rinse?  It is a mouthwash. It is a way of cleaning the mouth with a germ killing solution before your surgery. The solution contains chlorhexidine, commonly know as CHG.  It is used inside the mouth to kill bacteria known as Staphylococcus aureus.  Let your doctor know if you are allergic to chlorhexidine.    Why do I need to use CHG oral/dental rinse?  The CHG oral/dental rinse helps to kill bacteria in your mouth known as Staphylococcus aureus. This reduces the risk of infection at the surgical site.    Using your CHG oral/dental rinse.  STEPS: use your CHG oral/dental rinse after you brush your teeth the night before (at bedtime) and the morning of your surgery. Follow the directions on your prescription label.  Use the cap on the container to measure 15ml (fill cap to fill line)  Swish (gargle if you can) the mouthwash in your mouth for at least 30 seconds, (do not swallow) spit out.  After you use your CHG rinse, do not rinse your mouth with water, drink or eat. Please refer to prescription label for the appropriate time to resume oral intake.    What side effects might I have using the CHG oral/dental rinse?  CHG rinse will stick to the plaque on the teeth. Brush and floss just before use. Teeth brushing will help avoid staining of plaque during use.    Who should I contact if I have questions about the CHG oral/dental rinse?  Please call [facility], [facility] at [phone] if you have any  questions.

## 2023-11-03 LAB — STAPHYLOCOCCUS SPEC CULT: NORMAL

## 2023-11-06 ENCOUNTER — ANESTHESIA EVENT (OUTPATIENT)
Dept: OPERATING ROOM | Facility: HOSPITAL | Age: 73
End: 2023-11-06
Payer: MEDICARE

## 2023-11-08 ENCOUNTER — HOSPITAL ENCOUNTER (OUTPATIENT)
Facility: HOSPITAL | Age: 73
Setting detail: OUTPATIENT SURGERY
Discharge: HOME | End: 2023-11-08
Attending: OBSTETRICS & GYNECOLOGY | Admitting: OBSTETRICS & GYNECOLOGY
Payer: MEDICARE

## 2023-11-08 ENCOUNTER — ANESTHESIA (OUTPATIENT)
Dept: OPERATING ROOM | Facility: HOSPITAL | Age: 73
End: 2023-11-08
Payer: MEDICARE

## 2023-11-08 ENCOUNTER — APPOINTMENT (OUTPATIENT)
Dept: RADIOLOGY | Facility: HOSPITAL | Age: 73
End: 2023-11-08
Payer: MEDICARE

## 2023-11-08 VITALS
HEART RATE: 62 BPM | HEIGHT: 63 IN | WEIGHT: 117.06 LBS | OXYGEN SATURATION: 98 % | RESPIRATION RATE: 16 BRPM | BODY MASS INDEX: 20.74 KG/M2 | SYSTOLIC BLOOD PRESSURE: 130 MMHG | DIASTOLIC BLOOD PRESSURE: 91 MMHG | TEMPERATURE: 96.1 F

## 2023-11-08 DIAGNOSIS — N39.41 URGE URINARY INCONTINENCE: Primary | ICD-10-CM

## 2023-11-08 PROCEDURE — 2780000003 HC OR 278 NO HCPCS: Performed by: OBSTETRICS & GYNECOLOGY

## 2023-11-08 PROCEDURE — 2720000007 HC OR 272 NO HCPCS: Performed by: OBSTETRICS & GYNECOLOGY

## 2023-11-08 PROCEDURE — 2500000004 HC RX 250 GENERAL PHARMACY W/ HCPCS (ALT 636 FOR OP/ED): Performed by: NURSE ANESTHETIST, CERTIFIED REGISTERED

## 2023-11-08 PROCEDURE — A64590 PR IMPLANT PERIPH/GASTRIC NEUROSTIM/RECEIVER: Performed by: NURSE ANESTHETIST, CERTIFIED REGISTERED

## 2023-11-08 PROCEDURE — 76000 FLUOROSCOPY <1 HR PHYS/QHP: CPT

## 2023-11-08 PROCEDURE — C1889 IMPLANT/INSERT DEVICE, NOC: HCPCS | Performed by: OBSTETRICS & GYNECOLOGY

## 2023-11-08 PROCEDURE — 2500000004 HC RX 250 GENERAL PHARMACY W/ HCPCS (ALT 636 FOR OP/ED): Performed by: OBSTETRICS & GYNECOLOGY

## 2023-11-08 PROCEDURE — 99100 ANES PT EXTEME AGE<1 YR&>70: CPT | Performed by: ANESTHESIOLOGY

## 2023-11-08 PROCEDURE — 3700000002 HC GENERAL ANESTHESIA TIME - EACH INCREMENTAL 1 MINUTE: Performed by: OBSTETRICS & GYNECOLOGY

## 2023-11-08 PROCEDURE — C1778 LEAD, NEUROSTIMULATOR: HCPCS | Performed by: OBSTETRICS & GYNECOLOGY

## 2023-11-08 PROCEDURE — A64590 PR IMPLANT PERIPH/GASTRIC NEUROSTIM/RECEIVER: Performed by: ANESTHESIOLOGY

## 2023-11-08 PROCEDURE — 7100000010 HC PHASE TWO TIME - EACH INCREMENTAL 1 MINUTE: Performed by: OBSTETRICS & GYNECOLOGY

## 2023-11-08 PROCEDURE — 2500000005 HC RX 250 GENERAL PHARMACY W/O HCPCS: Performed by: OBSTETRICS & GYNECOLOGY

## 2023-11-08 PROCEDURE — 3600000004 HC OR TIME - INITIAL BASE CHARGE - PROCEDURE LEVEL FOUR: Performed by: OBSTETRICS & GYNECOLOGY

## 2023-11-08 PROCEDURE — 64561 IMPLANT NEUROELECTRODES: CPT | Performed by: OBSTETRICS & GYNECOLOGY

## 2023-11-08 PROCEDURE — 3700000001 HC GENERAL ANESTHESIA TIME - INITIAL BASE CHARGE: Performed by: OBSTETRICS & GYNECOLOGY

## 2023-11-08 PROCEDURE — 7100000009 HC PHASE TWO TIME - INITIAL BASE CHARGE: Performed by: OBSTETRICS & GYNECOLOGY

## 2023-11-08 PROCEDURE — 64590 INS/RPL PRPH SAC/GSTR NPG/R: CPT | Performed by: OBSTETRICS & GYNECOLOGY

## 2023-11-08 PROCEDURE — 7100000002 HC RECOVERY ROOM TIME - EACH INCREMENTAL 1 MINUTE: Performed by: OBSTETRICS & GYNECOLOGY

## 2023-11-08 PROCEDURE — 7100000001 HC RECOVERY ROOM TIME - INITIAL BASE CHARGE: Performed by: OBSTETRICS & GYNECOLOGY

## 2023-11-08 PROCEDURE — 3600000009 HC OR TIME - EACH INCREMENTAL 1 MINUTE - PROCEDURE LEVEL FOUR: Performed by: OBSTETRICS & GYNECOLOGY

## 2023-11-08 DEVICE — LEAD KIT, INTERSTIM SURESCAN MRI 4.32MM SPACING, 28CM LENGTH: Type: IMPLANTABLE DEVICE | Site: SACRUM | Status: FUNCTIONAL

## 2023-11-08 DEVICE — NEUROSTIMULATOR, INTERSTIM X, RECHARGE-FREE: Type: IMPLANTABLE DEVICE | Site: BUTTOCKS | Status: FUNCTIONAL

## 2023-11-08 DEVICE — ENVELOPE, NEURO, ABSORBABLE, MED: Type: IMPLANTABLE DEVICE | Site: BUTTOCKS | Status: FUNCTIONAL

## 2023-11-08 RX ORDER — DIPHENHYDRAMINE HYDROCHLORIDE 50 MG/ML
12.5 INJECTION INTRAMUSCULAR; INTRAVENOUS ONCE AS NEEDED
Status: DISCONTINUED | OUTPATIENT
Start: 2023-11-08 | End: 2023-11-08 | Stop reason: HOSPADM

## 2023-11-08 RX ORDER — LABETALOL HYDROCHLORIDE 5 MG/ML
5 INJECTION, SOLUTION INTRAVENOUS EVERY 5 MIN PRN
Status: DISCONTINUED | OUTPATIENT
Start: 2023-11-08 | End: 2023-11-08 | Stop reason: HOSPADM

## 2023-11-08 RX ORDER — PROPOFOL 10 MG/ML
INJECTION, EMULSION INTRAVENOUS CONTINUOUS PRN
Status: DISCONTINUED | OUTPATIENT
Start: 2023-11-08 | End: 2023-11-08

## 2023-11-08 RX ORDER — CEFAZOLIN SODIUM 2 G/100ML
2 INJECTION, SOLUTION INTRAVENOUS ONCE
Status: DISCONTINUED | OUTPATIENT
Start: 2023-11-08 | End: 2023-11-08 | Stop reason: HOSPADM

## 2023-11-08 RX ORDER — IPRATROPIUM BROMIDE 0.5 MG/2.5ML
500 SOLUTION RESPIRATORY (INHALATION) EVERY 30 MIN PRN
Status: DISCONTINUED | OUTPATIENT
Start: 2023-11-08 | End: 2023-11-08 | Stop reason: HOSPADM

## 2023-11-08 RX ORDER — IBUPROFEN 600 MG/1
600 TABLET ORAL 4 TIMES DAILY PRN
Qty: 60 TABLET | Refills: 1 | Status: SHIPPED | OUTPATIENT
Start: 2023-11-08

## 2023-11-08 RX ORDER — FENTANYL CITRATE 50 UG/ML
50 INJECTION, SOLUTION INTRAMUSCULAR; INTRAVENOUS EVERY 5 MIN PRN
Status: DISCONTINUED | OUTPATIENT
Start: 2023-11-08 | End: 2023-11-08 | Stop reason: HOSPADM

## 2023-11-08 RX ORDER — BUPIVACAINE HCL/EPINEPHRINE 0.5-1:200K
VIAL (ML) INJECTION AS NEEDED
Status: DISCONTINUED | OUTPATIENT
Start: 2023-11-08 | End: 2023-11-08 | Stop reason: HOSPADM

## 2023-11-08 RX ORDER — CEPHALEXIN 500 MG/1
500 CAPSULE ORAL 2 TIMES DAILY
Qty: 10 CAPSULE | Refills: 0 | Status: SHIPPED | OUTPATIENT
Start: 2023-11-08 | End: 2023-11-13

## 2023-11-08 RX ORDER — SODIUM CHLORIDE, SODIUM LACTATE, POTASSIUM CHLORIDE, CALCIUM CHLORIDE 600; 310; 30; 20 MG/100ML; MG/100ML; MG/100ML; MG/100ML
40 INJECTION, SOLUTION INTRAVENOUS CONTINUOUS
Status: DISCONTINUED | OUTPATIENT
Start: 2023-11-08 | End: 2023-11-08 | Stop reason: HOSPADM

## 2023-11-08 RX ORDER — DEXAMETHASONE SODIUM PHOSPHATE 4 MG/ML
INJECTION, SOLUTION INTRA-ARTICULAR; INTRALESIONAL; INTRAMUSCULAR; INTRAVENOUS; SOFT TISSUE AS NEEDED
Status: DISCONTINUED | OUTPATIENT
Start: 2023-11-08 | End: 2023-11-08

## 2023-11-08 RX ORDER — SODIUM CHLORIDE, SODIUM LACTATE, POTASSIUM CHLORIDE, CALCIUM CHLORIDE 600; 310; 30; 20 MG/100ML; MG/100ML; MG/100ML; MG/100ML
100 INJECTION, SOLUTION INTRAVENOUS CONTINUOUS
Status: DISCONTINUED | OUTPATIENT
Start: 2023-11-08 | End: 2023-11-08 | Stop reason: HOSPADM

## 2023-11-08 RX ORDER — MIDAZOLAM HYDROCHLORIDE 1 MG/ML
INJECTION, SOLUTION INTRAMUSCULAR; INTRAVENOUS AS NEEDED
Status: DISCONTINUED | OUTPATIENT
Start: 2023-11-08 | End: 2023-11-08

## 2023-11-08 RX ORDER — OXYCODONE HYDROCHLORIDE 5 MG/1
5 TABLET ORAL EVERY 6 HOURS PRN
Qty: 15 TABLET | Refills: 0 | Status: SHIPPED | OUTPATIENT
Start: 2023-11-08 | End: 2023-12-11 | Stop reason: ALTCHOICE

## 2023-11-08 RX ORDER — MEPERIDINE HYDROCHLORIDE 25 MG/ML
12.5 INJECTION INTRAMUSCULAR; INTRAVENOUS; SUBCUTANEOUS EVERY 10 MIN PRN
Status: DISCONTINUED | OUTPATIENT
Start: 2023-11-08 | End: 2023-11-08 | Stop reason: HOSPADM

## 2023-11-08 RX ORDER — ONDANSETRON HYDROCHLORIDE 2 MG/ML
INJECTION, SOLUTION INTRAVENOUS AS NEEDED
Status: DISCONTINUED | OUTPATIENT
Start: 2023-11-08 | End: 2023-11-08

## 2023-11-08 RX ORDER — PHENYLEPHRINE HCL IN 0.9% NACL 1 MG/10 ML
SYRINGE (ML) INTRAVENOUS AS NEEDED
Status: DISCONTINUED | OUTPATIENT
Start: 2023-11-08 | End: 2023-11-08

## 2023-11-08 RX ORDER — ALBUTEROL SULFATE 0.83 MG/ML
2.5 SOLUTION RESPIRATORY (INHALATION) EVERY 30 MIN PRN
Status: DISCONTINUED | OUTPATIENT
Start: 2023-11-08 | End: 2023-11-08 | Stop reason: HOSPADM

## 2023-11-08 RX ORDER — ONDANSETRON HYDROCHLORIDE 2 MG/ML
4 INJECTION, SOLUTION INTRAVENOUS ONCE AS NEEDED
Status: DISCONTINUED | OUTPATIENT
Start: 2023-11-08 | End: 2023-11-08 | Stop reason: HOSPADM

## 2023-11-08 RX ORDER — FENTANYL CITRATE 50 UG/ML
INJECTION, SOLUTION INTRAMUSCULAR; INTRAVENOUS AS NEEDED
Status: DISCONTINUED | OUTPATIENT
Start: 2023-11-08 | End: 2023-11-08

## 2023-11-08 RX ADMIN — MIDAZOLAM 2 MG: 1 INJECTION INTRAMUSCULAR; INTRAVENOUS at 08:04

## 2023-11-08 RX ADMIN — Medication 100 MCG: at 08:17

## 2023-11-08 RX ADMIN — Medication 100 MCG: at 08:36

## 2023-11-08 RX ADMIN — FENTANYL CITRATE 25 MCG: 50 INJECTION INTRAMUSCULAR; INTRAVENOUS at 08:10

## 2023-11-08 RX ADMIN — DEXAMETHASONE SODIUM PHOSPHATE 4 MG: 4 INJECTION, SOLUTION INTRAMUSCULAR; INTRAVENOUS at 08:11

## 2023-11-08 RX ADMIN — Medication 100 MCG: at 08:25

## 2023-11-08 RX ADMIN — FENTANYL CITRATE 25 MCG: 50 INJECTION INTRAMUSCULAR; INTRAVENOUS at 08:36

## 2023-11-08 RX ADMIN — ONDANSETRON 4 MG: 2 INJECTION, SOLUTION INTRAMUSCULAR; INTRAVENOUS at 08:11

## 2023-11-08 RX ADMIN — FENTANYL CITRATE 25 MCG: 50 INJECTION INTRAMUSCULAR; INTRAVENOUS at 08:05

## 2023-11-08 RX ADMIN — PROPOFOL 125 MCG/KG/MIN: 10 INJECTION, EMULSION INTRAVENOUS at 08:05

## 2023-11-08 RX ADMIN — FENTANYL CITRATE 25 MCG: 50 INJECTION INTRAMUSCULAR; INTRAVENOUS at 08:30

## 2023-11-08 RX ADMIN — SODIUM CHLORIDE, SODIUM LACTATE, POTASSIUM CHLORIDE, AND CALCIUM CHLORIDE 100 ML/HR: 600; 310; 30; 20 INJECTION, SOLUTION INTRAVENOUS at 06:48

## 2023-11-08 SDOH — HEALTH STABILITY: MENTAL HEALTH: CURRENT SMOKER: 0

## 2023-11-08 ASSESSMENT — COLUMBIA-SUICIDE SEVERITY RATING SCALE - C-SSRS
2. HAVE YOU ACTUALLY HAD ANY THOUGHTS OF KILLING YOURSELF?: NO
6. HAVE YOU EVER DONE ANYTHING, STARTED TO DO ANYTHING, OR PREPARED TO DO ANYTHING TO END YOUR LIFE?: NO
1. IN THE PAST MONTH, HAVE YOU WISHED YOU WERE DEAD OR WISHED YOU COULD GO TO SLEEP AND NOT WAKE UP?: NO

## 2023-11-08 ASSESSMENT — PAIN DESCRIPTION - DESCRIPTORS
DESCRIPTORS: BURNING

## 2023-11-08 ASSESSMENT — PAIN - FUNCTIONAL ASSESSMENT
PAIN_FUNCTIONAL_ASSESSMENT: 0-10

## 2023-11-08 ASSESSMENT — PAIN SCALES - GENERAL
PAINLEVEL_OUTOF10: 2
PAINLEVEL_OUTOF10: 2
PAINLEVEL_OUTOF10: 0 - NO PAIN
PAINLEVEL_OUTOF10: 0 - NO PAIN
PAIN_LEVEL: 0

## 2023-11-08 NOTE — ANESTHESIA PREPROCEDURE EVALUATION
Patient: Sandra Jaime    Procedure Information       Date/Time: 11/08/23 0800    Procedures:       Insertion Continence Control Stimulator      Insertion Stimulator Peripheral Nerve Lower Extremity      Fluoroscopy Exam Under Anesthesia    Location: [facility] / [facility]    Surgeons: Rashaad Harmon MD          Past Medical History:   Diagnosis Date    Anxiety     Awareness under anesthesia     Diverticulosis of intestine, part unspecified, without perforation or abscess without bleeding     Diverticulosis    Essential (primary) hypertension 04/19/2016    Benign hypertension    Hyperlipidemia, unspecified     Dyslipidemia    Macular degeneration     Personal history of other diseases of the nervous system and sense organs     History of carpal tunnel syndrome    Personal history of other mental and behavioral disorders     History of depression    Tachycardia     Unspecified urinary incontinence 04/19/2016    Urinary incontinence    Vertigo      Past Surgical History:   Procedure Laterality Date    CATARACT EXTRACTION      HYSTERECTOMY  04/17/2014    Hysterectomy    RECTAL VAGINAL FISTULECTOMY  04/17/2014    Rectocele Repair    TONSILLECTOMY  04/17/2014    Tonsillectomy    US GUIDED ASPIRATION INJECTION MAJOR JOINT  07/27/2020    US GUIDED ASPIRATION INJECTION MAJOR JOINT 7/27/2020 [facility]       Relevant Problems   Anesthesia (within normal limits)      Cardiovascular   (+) Atrial tachycardia   (+) Essential hypertension   (+) Hyperlipidemia      Endocrine   (+) Subclinical hypothyroidism      Neuro/Psych   (+) Anxiety   (+) Bilateral carpal tunnel syndrome      GI/Hepatic   (+) Cholelithiasis without obstruction      Musculoskeletal   (+) Bilateral carpal tunnel syndrome   (+) Cervical spondylosis without myelopathy   (+) Osteoarthrosis involving upper arm      Other   (+) Arthritis of right hip       Clinical information reviewed:   Tobacco  Allergies  Meds   Med Hx  Surg Hx   Fam Hx  Soc  Hx        NPO Detail:  NPO/Void Status  Date of Last Liquid: 11/08/23  Time of Last Liquid: 0550  Date of Last Solid: 11/08/23  Time of Last Solid: 1830  Time of Last Void: 0625         Physical Exam    Airway  Mallampati: I  TM distance: >3 FB  Neck ROM: full     Cardiovascular - normal exam     Dental - normal exam     Pulmonary - normal exam     Abdominal            Anesthesia Plan    ASA 3     general   (TIVA)  The patient is not a current smoker.    intravenous induction   Anesthetic plan and risks discussed with patient.    Plan discussed with CRNA and CAA.

## 2023-11-08 NOTE — PERIOPERATIVE NURSING NOTE
Patient in Phase 2; dressed and up to chair with RN assist. Tolerating po fluids, minimal complaint of pain and no complaint of nausea.     Family at bedside; discussed discharge instructions with patient and Family. All questions at this time answered.     Patient clinically appropriate for discharge. IV removed and patient transported to discharge area via wheelchair.

## 2023-11-08 NOTE — ANESTHESIA POSTPROCEDURE EVALUATION
Patient: Sandra Jaime    Procedure Summary       Date: 11/08/23 Room / Location: [facility]    Anesthesia Start: 0801 Anesthesia Stop: 0900    Procedures:       Insertion Continence Control Stimulator      Insertion Stimulator Peripheral Nerve Lower Extremity      Fluoroscopy Exam Under Anesthesia Diagnosis:       Urge urinary incontinence      (Urge urinary incontinence [N39.41])    Surgeons: Rashaad Harmon MD Responsible Provider: Tai Vincent MD    Anesthesia Type: general ASA Status: 3            Anesthesia Type: general    Vitals Value Taken Time   /70 11/08/23 0915   Temp 35.8 °C (96.4 °F) 11/08/23 0855   Pulse 60 11/08/23 0915   Resp 15 11/08/23 0915   SpO2 100 % 11/08/23 0915       Anesthesia Post Evaluation    Patient location during evaluation: bedside  Patient participation: complete - patient participated  Level of consciousness: responsive to verbal stimuli  Pain score: 0  Pain management: adequate  Multimodal analgesia pain management approach  Cardiovascular status: acceptable  Respiratory status: acceptable  Hydration status: acceptable  Comments: NO PONV, PT STABLE UPON DISCHARGE.        There were no known notable events for this encounter.

## 2023-11-08 NOTE — DISCHARGE INSTRUCTIONS
Post op instructions after Interstim stage 2 or device revision/replacement       Since you have received medication for pain or sedation while under our care, you should not drive, operate machinery, drink alcohol, or sign any legal documents for 24 hours.? You should have someone with you tonight.     Activity When You Go Home     Take it easy.? Avoid strenuous activity, lifting heavy objects, bending, twisting and stretching.? Avoid pressure on the lead for two weeks minimum. Being too active too soon can cause the lead to move out of position.   Continue to take all medications you would normally take for your urinary/voiding symptoms unless advised by you doctor.?    Take antibiotics as prescribed.?    You will receive your patient  and instructions for using it to activate the stimulator as well as how to increase and decrease the intensity of the stimulation as you desire.     When to call our office:     If you experience unusual or severe pain not relieved by your pain medication, excessive bleeding, excessive swelling or redness, significant difficulty emptying your bladder, or fever over 100.6F you should call your doctor.?  If you need to be evaluated by a physician on an emergent basis, please go to the nearest ER and have the ER physician contact [facility] for assistance.?    If you cannot reach medical assistance, seek care at an emergency room and take this paperwork with you     Diet: no change     Wound care:     You can shower after 24 hours but keep the incision dry afterwards, but no baths during the healing period of two weeks.?    You may use ice to the incision.  The surgical glue should begin to peel off in 1 to 2 weeks.  You may activate the stimulator with your  whenever you desire.      Medication:    You may resume your daily prescription medication schedule       Permanent Precautions:     Diathermy (for muscle relaxation) should not be used if  nerve stimulator is present.   Some theft detectors and screening devices may rarely cause the stimulator to turn on or off, or cause a harmless brief sensation as you approach the device.   You will have an ID card to present at the airport or other security area in the event the device activates the security unit.   Be certain to let your physicians know you have a pacemaker device and always shut the device off prior to undergoing any surgical procedure.       Frequently asked questions:   Can you sit on heated car seats??? Yes   Can you go to a tanning michael????? Yes, but the area where the battery is may burn more easily   Can a heating pad be used over the area????? Yes   Will microwaves cause any interference????? No and there is no interference for other electrical devices such as computer or cell phone   Will the device affect medical devices in a hospital????? No, but it can interfere with a medical device applied to you such as EKG   Can you use a hot tub or sauna????? Yes with maximal temperature of 102 no earlier than 2 weeks following her procedure

## 2023-11-08 NOTE — OP NOTE
PREOPERATIVE DIAGNOSIS:    #1  Refractory urge urinary incontinence    2.  Successful office PNE    POSTOPERATIVE DIAGNOSIS: Same.    PROCEDURE: Implantation of sacral neuromodulation lead (stage I InterStim) under fluoroscopy and IPG placement with complex reprogramming    ATTENDING SURGEON: ESTELA Harmon MD    RESIDENT SURGEON:     ANESTHESIA:  MAC    EBL:  5cc.    DRAINS: None.    SPECIMENS: None.    COMPLICATIONS: None    INDICATIONS FOR PROCEDURE: The patient is a 73 y.o. femalepresents today with a history of refractory urge urinary incontinence.  She has exhausted all oral therapies and underwent a successful office PNE with significant improvements in her lower urinary tract urgency and frequency complaints.  She wishes to proceed with a combined stage I and stage II InterStim.  Risk benefits and alternatives were discussed with the patient and consent was signed and placed in the chart..      DETAILS OF PROCEDURE: After consent was signed and placed in the chart, the patient was taken to the operating room where anesthesia was found be adequate. The patient received preoperative antibiotics per protocol.  The patient was placed into the prone position. The upper buttocks were taped per protocol then prepped and draped in the usual sterile fashion.    In the midline, we marked out a spot 9 centimeters cranial to the tip of the coccyx.  We then marked out 2 centimeters on either side of the midline as our entry points.  These were anesthetized with 1% lidocaine with epinephrine.   Fluoroscopic guidance, finder needles were then placed into the S3 foramen and tested for motor and perry response.  Our final selection was confirmed under fluoroscopy to be in the left S3 foramina. The needle was tested and showed a good  perry and toe response, so we elected to use this as our location.   We removed the stylette and a directional guidewire was passed. We removed the needle and made a 2 mm stab incision  at this site. We passed the white dilator over our wire and then verified the correct depth on fluoroscopy. We then removed the inner obturator from the dilator sheath and replaced it with our InterStim quadripolar lead using the curved stylet. We exposed the ends of the leads and verified position on fluoroscopy and tested with low voltage responses at 1.2Mamp at the 0,1,2,3 lead. After verifying good position fluoroscopically, under live fluoroscopic guidance the tines were deployed. We then created a subcutaneous pouch for our battery via a 4-5 cm incision in the buttock on the left.  This was made of sufficient size to allow for the placement of the IPG.  Using the T shaped white dilator, the lead was tunneled into this pocket.  All bleeders were cauterized with electrosurgery.  The IPG was then attached to the lead and placed within a TYRX antibiotic pouch.  This was then tested with normal impedances across all 4 leads.  A deep layer of several interrupted 2-0 Vicryl sutures was then used to close the camper's fascia.  A subcuticular 4-0 Vicryl suture was then used to close the skin and Dermabond applied.  The procedure was then terminated.    The patient tolerated the procedure well.  Sponge lap needle counts correct x2.  The patient was taken to the postanesthesia care unit to undergo complex reprogramming.    Rashaad Harmon  Phone Number: [phone]

## 2023-11-09 ENCOUNTER — TELEPHONE (OUTPATIENT)
Dept: PRIMARY CARE | Facility: CLINIC | Age: 73
End: 2023-11-09
Payer: MEDICARE

## 2023-11-09 NOTE — TELEPHONE ENCOUNTER
----- Message from ALICE Mckeon-CNP sent at 11/8/2023 12:11 PM EST -----  Normal  needs following in 2 years

## 2023-11-10 ASSESSMENT — PAIN SCALES - GENERAL: PAINLEVEL_OUTOF10: 0 - NO PAIN

## 2023-11-17 ENCOUNTER — TELEPHONE (OUTPATIENT)
Dept: UROLOGY | Facility: CLINIC | Age: 73
End: 2023-11-17

## 2023-11-17 ENCOUNTER — LAB (OUTPATIENT)
Dept: LAB | Facility: LAB | Age: 73
End: 2023-11-17
Payer: MEDICARE

## 2023-11-17 DIAGNOSIS — N39.41 URGE INCONTINENCE OF URINE: ICD-10-CM

## 2023-11-17 DIAGNOSIS — N39.41 URGE INCONTINENCE OF URINE: Primary | ICD-10-CM

## 2023-11-17 LAB
APPEARANCE UR: ABNORMAL
BACTERIA #/AREA URNS AUTO: ABNORMAL /HPF
BILIRUB UR STRIP.AUTO-MCNC: NEGATIVE MG/DL
COLOR UR: YELLOW
GLUCOSE UR STRIP.AUTO-MCNC: NEGATIVE MG/DL
KETONES UR STRIP.AUTO-MCNC: NEGATIVE MG/DL
LEUKOCYTE ESTERASE UR QL STRIP.AUTO: ABNORMAL
NITRITE UR QL STRIP.AUTO: NEGATIVE
PH UR STRIP.AUTO: 5 [PH]
PROT UR STRIP.AUTO-MCNC: ABNORMAL MG/DL
RBC # UR STRIP.AUTO: NEGATIVE /UL
RBC #/AREA URNS AUTO: ABNORMAL /HPF
SP GR UR STRIP.AUTO: 1.01
SQUAMOUS #/AREA URNS AUTO: ABNORMAL /HPF
TRANS CELLS #/AREA UR COMP ASSIST: ABNORMAL /HPF
UROBILINOGEN UR STRIP.AUTO-MCNC: <2 MG/DL
WBC #/AREA URNS AUTO: >50 /HPF
WBC CLUMPS #/AREA URNS AUTO: ABNORMAL /HPF

## 2023-11-17 PROCEDURE — 87086 URINE CULTURE/COLONY COUNT: CPT

## 2023-11-17 PROCEDURE — 87186 SC STD MICRODIL/AGAR DIL: CPT

## 2023-11-20 ENCOUNTER — OFFICE VISIT (OUTPATIENT)
Dept: ORTHOPEDIC SURGERY | Facility: HOSPITAL | Age: 73
End: 2023-11-20
Payer: MEDICARE

## 2023-11-20 ENCOUNTER — TELEPHONE (OUTPATIENT)
Dept: UROLOGY | Facility: CLINIC | Age: 73
End: 2023-11-20

## 2023-11-20 VITALS — WEIGHT: 117 LBS | BODY MASS INDEX: 20.73 KG/M2 | HEIGHT: 63 IN

## 2023-11-20 DIAGNOSIS — M67.951 TENDINOPATHY OF RIGHT GLUTEUS MEDIUS: Primary | ICD-10-CM

## 2023-11-20 DIAGNOSIS — M67.959 TENDINOPATHY OF GLUTEUS MEDIUS: ICD-10-CM

## 2023-11-20 DIAGNOSIS — N39.0 ACUTE UTI: Primary | ICD-10-CM

## 2023-11-20 DIAGNOSIS — N39.0 CHRONIC UTI: ICD-10-CM

## 2023-11-20 LAB — BACTERIA UR CULT: ABNORMAL

## 2023-11-20 PROCEDURE — 2500000004 HC RX 250 GENERAL PHARMACY W/ HCPCS (ALT 636 FOR OP/ED): Performed by: EMERGENCY MEDICINE

## 2023-11-20 PROCEDURE — 99213 OFFICE O/P EST LOW 20 MIN: CPT | Performed by: EMERGENCY MEDICINE

## 2023-11-20 PROCEDURE — 20551 NJX 1 TENDON ORIGIN/INSJ: CPT | Performed by: EMERGENCY MEDICINE

## 2023-11-20 PROCEDURE — 1125F AMNT PAIN NOTED PAIN PRSNT: CPT | Performed by: EMERGENCY MEDICINE

## 2023-11-20 PROCEDURE — 1160F RVW MEDS BY RX/DR IN RCRD: CPT | Performed by: EMERGENCY MEDICINE

## 2023-11-20 PROCEDURE — 1036F TOBACCO NON-USER: CPT | Performed by: EMERGENCY MEDICINE

## 2023-11-20 PROCEDURE — 2500000005 HC RX 250 GENERAL PHARMACY W/O HCPCS: Performed by: EMERGENCY MEDICINE

## 2023-11-20 PROCEDURE — 1159F MED LIST DOCD IN RCRD: CPT | Performed by: EMERGENCY MEDICINE

## 2023-11-20 RX ORDER — NITROFURANTOIN 25; 75 MG/1; MG/1
100 CAPSULE ORAL 2 TIMES DAILY
Qty: 14 CAPSULE | Refills: 0 | Status: SHIPPED | OUTPATIENT
Start: 2023-11-20 | End: 2023-11-27

## 2023-11-20 RX ORDER — TRIAMCINOLONE ACETONIDE 40 MG/ML
80 INJECTION, SUSPENSION INTRA-ARTICULAR; INTRAMUSCULAR
Status: COMPLETED | OUTPATIENT
Start: 2023-11-20 | End: 2023-11-20

## 2023-11-20 RX ORDER — LIDOCAINE HYDROCHLORIDE 10 MG/ML
3 INJECTION INFILTRATION; PERINEURAL
Status: COMPLETED | OUTPATIENT
Start: 2023-11-20 | End: 2023-11-20

## 2023-11-20 RX ADMIN — TRIAMCINOLONE ACETONIDE 80 MG: 200 INJECTION, SUSPENSION INTRA-ARTICULAR; INTRAMUSCULAR at 14:59

## 2023-11-20 RX ADMIN — LIDOCAINE HYDROCHLORIDE 3 ML: 10 INJECTION, SOLUTION INFILTRATION; PERINEURAL at 14:59

## 2023-11-20 ASSESSMENT — PAIN SCALES - GENERAL: PAINLEVEL_OUTOF10: 6

## 2023-11-20 ASSESSMENT — PAIN DESCRIPTION - DESCRIPTORS: DESCRIPTORS: ACHING;SHARP

## 2023-11-20 ASSESSMENT — PAIN - FUNCTIONAL ASSESSMENT: PAIN_FUNCTIONAL_ASSESSMENT: 0-10

## 2023-11-20 NOTE — TELEPHONE ENCOUNTER
----- Message from Rashaad Harmon MD sent at 11/20/2023  2:08 PM EST -----  Regarding: Call back  She does have another urinary tract infection.  I have called in Macrobid for her to begin now twice daily for the next 7 days.  I would like her to follow-up with a repeat urine culture in roughly 2 weeks.  Should she have another urinary tract infection, we will proceed with a daily low-dose chronic UTI medication.    Can you let her know?    Thanks!

## 2023-11-20 NOTE — PROGRESS NOTES
Subjective   Sandra Jaime is a 73 y.o. female who presents for Follow-up of the Right Hip    HPI  11/20/23: Patient returns today for right hip pain.  We did perform a right hip gluteus medius tendon insertion injection on 4/6/2023.  She felt this worked quite well for her up until recently.  She would to have a repeat injection performed today.    4/27/23: Patient returns today for right hip pain. She states that the gluteus medius insertion injection she received on 4/6/2023 did provide significant relief along the outside of her hip. However, she has since developed worsening posterior hip pain. This pain is exacerbated by weightbearing activities and sitting. Pain is associated with throbbing. Pain is not associated with acute trauma, deformity, ecchymosis, paresthesias, weakness, rash, erythema, or fevers.     4/6/23: Patient returns today for right lateral hip pain. Unlike previous injections, the injection she received on 9/19/2022 did work quite well up until recently. Her  was recently diagnosed with brain cancer and she has had an increase activities at home taking care of him. Considering this, she is requesting a repeat injection today.     9/19/22: Patient returns today status post percutaneous tenotomy of right hip gluteus medius. Date of procedure 1/19/2022. Initially, she felt that she was improving, however, now she feels that she has not improved dramatically since her last visit. She is continue with therapeutic exercise without significant improvement. She continues to localize her symptoms along the lateral aspect of her hip that is associated with rubbing and crepitus. She would like to discuss further treatment options.     2/28/22: Patient returns today for reevaluation status post percutaneous tenotomy of the right gluteus medius tendon. Date of procedure was 1/19/2022. Overall, she feels that she is continuing to improve. She does continue to have mild soreness and weakness with  "her hip, however she feels that this is improving. She states that physical therapy is going well.     1/27/22: 71-year-old female presents status post percutaneous tenotomy of gluteus medius tendinopathy. Date of procedure was 1/19/2022. Overall, she feels that she is doing well. She has been limiting her activity and mainly using crutches for ambulation. She states that her pain is slightly improved from prior to the procedure. She has kept the dressing dry.     ROS: All pertinent positive symptoms are included in the history of present illness.    All other systems have been reviewed and are negative and noncontributory to this patient's current ailments.    Objective     Vitals:    11/20/23 1418   Weight: 53.1 kg (117 lb)   Height: 1.6 m (5' 3\")       Physical Exam  General/Constitutional: No apparent distress. Well-nourished and well developed.  Head: Normocephalic, Atraumatic.   Eyes: EOMI.  Vascular: No edema, swelling or tenderness, except as noted in detailed exam.  Respiratory: Non-labored breathing.  Integumentary: No impressive skin lesions present, except as noted in detailed exam.  Neurological: Alert and oriented.  Psychological: Normal mood and affect.  Musculoskeletal: Normal, except as noted in detailed exam.  Right hip: Incision well-healed. No TTP over the greater trochanter. Muscle strength 5 out of 5 throughout all planes. No pain with resisted hip abduction. Negative FADIR. Negative CLAIR. Pain with resisted knee flexion at the hamstring insertion. Palpable tenderness along the ischial tuberosity at the origin of the proximal hamstring.       - Impression -  Satisfactory appearance right total hip arthroplasty.    Patient ID: Sandra Jaime is a 73 y.o. female.    L Inj/Asp on 11/20/2023 2:59 PM  Indications: pain  Details: 25 G needle, ultrasound-guided posterior approach  Medications: 80 mg triamcinolone acetonide 40 mg/mL; 3 mL lidocaine 10 mg/mL (1 %)  Outcome: tolerated well, no " immediate complications  Procedure, treatment alternatives, risks and benefits explained, specific risks discussed. Consent was given by the patient. Immediately prior to procedure a time out was called to verify the correct patient, procedure, equipment, support staff and site/side marked as required. Patient was prepped and draped in the usual sterile fashion.           Assessment/Plan   Problem List Items Addressed This Visit    None  Visit Diagnoses       Tendinopathy of right gluteus medius    -  Primary    Tendinopathy of gluteus medius        Relevant Orders    Point of Care Ultrasound (Completed)          Patient ID: Sandra Jaime is a 73 y.o. female.    Discussed risks versus benefits of having injection performed. Patient has elected to proceed with procedure. Please refer to procedure note above. Discussed with patient to limit weightbearing activities over the next 24-48 hours, they can then progress to full activities as tolerated. Discussed with patient to avoid water submersion over the next 2 days. Discussed with patient to call me immediately if they develop worsening pain, rash, erythema, or fevers. Patient should follow-up as needed if pain persists or worsens.    Jd Christian,   Sports Medicine  [facility]     ** Please excuse any errors in grammar or translation related to this dictation. Voice recognition software was utilized to prepare this document. **

## 2023-11-21 DIAGNOSIS — B37.9 YEAST INFECTION: Primary | ICD-10-CM

## 2023-11-21 RX ORDER — FLUCONAZOLE 150 MG/1
TABLET ORAL
Qty: 2 TABLET | Refills: 0 | Status: SHIPPED | OUTPATIENT
Start: 2023-11-21 | End: 2023-11-30 | Stop reason: SDUPTHER

## 2023-11-28 ENCOUNTER — APPOINTMENT (OUTPATIENT)
Dept: UROLOGY | Facility: CLINIC | Age: 73
End: 2023-11-28
Payer: MEDICARE

## 2023-11-30 ENCOUNTER — OFFICE VISIT (OUTPATIENT)
Dept: UROLOGY | Facility: CLINIC | Age: 73
End: 2023-11-30
Payer: MEDICARE

## 2023-11-30 VITALS — DIASTOLIC BLOOD PRESSURE: 83 MMHG | SYSTOLIC BLOOD PRESSURE: 141 MMHG

## 2023-11-30 DIAGNOSIS — N95.2 VAGINAL ATROPHY: ICD-10-CM

## 2023-11-30 DIAGNOSIS — R39.9 UTI SYMPTOMS: Primary | ICD-10-CM

## 2023-11-30 DIAGNOSIS — N39.41 URGE URINARY INCONTINENCE: ICD-10-CM

## 2023-11-30 DIAGNOSIS — N76.0 ACUTE VAGINITIS: ICD-10-CM

## 2023-11-30 DIAGNOSIS — B37.9 YEAST INFECTION: ICD-10-CM

## 2023-11-30 LAB
POC APPEARANCE, URINE: ABNORMAL
POC BILIRUBIN, URINE: NEGATIVE
POC BLOOD, URINE: ABNORMAL
POC COLOR, URINE: YELLOW
POC GLUCOSE, URINE: NEGATIVE MG/DL
POC KETONES, URINE: NEGATIVE MG/DL
POC LEUKOCYTES, URINE: ABNORMAL
POC NITRITE,URINE: NEGATIVE
POC PH, URINE: 8.5 PH
POC PROTEIN, URINE: ABNORMAL MG/DL
POC SPECIFIC GRAVITY, URINE: 1.01
POC UROBILINOGEN, URINE: 0.2 EU/DL

## 2023-11-30 PROCEDURE — 95972 ALYS CPLX SP/PN NPGT W/PRGRM: CPT | Performed by: OBSTETRICS & GYNECOLOGY

## 2023-11-30 PROCEDURE — 1036F TOBACCO NON-USER: CPT | Performed by: OBSTETRICS & GYNECOLOGY

## 2023-11-30 PROCEDURE — 81003 URINALYSIS AUTO W/O SCOPE: CPT | Performed by: OBSTETRICS & GYNECOLOGY

## 2023-11-30 PROCEDURE — 87086 URINE CULTURE/COLONY COUNT: CPT | Performed by: OBSTETRICS & GYNECOLOGY

## 2023-11-30 PROCEDURE — 1125F AMNT PAIN NOTED PAIN PRSNT: CPT | Performed by: OBSTETRICS & GYNECOLOGY

## 2023-11-30 PROCEDURE — 3077F SYST BP >= 140 MM HG: CPT | Performed by: OBSTETRICS & GYNECOLOGY

## 2023-11-30 PROCEDURE — 99214 OFFICE O/P EST MOD 30 MIN: CPT | Performed by: OBSTETRICS & GYNECOLOGY

## 2023-11-30 PROCEDURE — 3079F DIAST BP 80-89 MM HG: CPT | Performed by: OBSTETRICS & GYNECOLOGY

## 2023-11-30 PROCEDURE — 1159F MED LIST DOCD IN RCRD: CPT | Performed by: OBSTETRICS & GYNECOLOGY

## 2023-11-30 PROCEDURE — 1160F RVW MEDS BY RX/DR IN RCRD: CPT | Performed by: OBSTETRICS & GYNECOLOGY

## 2023-11-30 RX ORDER — SULFAMETHOXAZOLE AND TRIMETHOPRIM 800; 160 MG/1; MG/1
1 TABLET ORAL 2 TIMES DAILY
Qty: 14 TABLET | Refills: 0 | Status: SHIPPED | OUTPATIENT
Start: 2023-11-30 | End: 2023-12-07

## 2023-11-30 RX ORDER — CLOBETASOL PROPIONATE 0.5 MG/G
OINTMENT TOPICAL
Qty: 30 G | Refills: 1 | Status: SHIPPED | OUTPATIENT
Start: 2023-11-30

## 2023-11-30 RX ORDER — ESTRADIOL 0.1 MG/G
CREAM VAGINAL
Qty: 42.5 G | Refills: 3 | Status: SHIPPED | OUTPATIENT
Start: 2023-11-30

## 2023-11-30 RX ORDER — FLUCONAZOLE 150 MG/1
TABLET ORAL
Qty: 2 TABLET | Refills: 0 | Status: SHIPPED | OUTPATIENT
Start: 2023-11-30 | End: 2023-12-07 | Stop reason: ALTCHOICE

## 2023-11-30 NOTE — PROGRESS NOTES
Subjective   Patient ID: Sandra Jaime is a 73 y.o. female who presents for follow up.      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, urinary urgency and frequency and urge related incontinence, and vaginal atrophy having undergone 05/10/2023 sacrospinous ligament suspension with anterior repair and perineorrhaphy presenting 7/7/23 urodynamics.with worsening postoperative urinary incontinence having undergone 100 units of Botox 07/10/2023 followed with UTI having undergone successful office PNE 10/13/2023 and 11/08/2023 Implantation of sacral neuromodulation lead (stage I InterStim) under fluoroscopy and IPG placement with complex reprogramming     The patient had a UTI 11/20/23 and was prescribed Macrobid  twice daily for 7 days. She noted a yeast infection after starting the antibiotics.   She noted burning and has a strong odor to the urine. She also complaints of vaginal itching and some discharge. GEN path was obatained    The patient has noted some improvement in her lower urinary tract symptoms.  On closer examination she is now only urinating 1 time a night and is having significant improvements in urgency and frequency complaints during the daytime urinating every 3-4 hours.    She denies any bowel related complaints, no fecal or flatal incontinence.    She has no other complaints.      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    The patient did note significant benefits with the office PNE.  She noted  decreased urgency and frequency and incontinence episodes.  She was overall very satisfied with the trial and wishes to proceed with combined stage I/II InterStim.    She is under a great amount of stress as her  is in hospice.     She denies any vaginal complaints, no abnormal bleeding or discharge.     She denies any bowel related complaints, no fecal or flatal incontinence.    She has no other complaints.    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     She has no other complaints.          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     The patient completed 7 days of Levaquin for her most recent UTI, however she continues to note worsening urinary urgency and frequency. She notes 2-3 episodes of nocturia but denies any enuresis. She voids every 1-3 hours during the day with terminal incontinence on the way to the bathroom. She states she does not have a sensation of complete bladder emptying and has to double void. She denies leaking on laughing, coughing and sneezing. She also notes abdominal pain since this morning.      She denies any vaginal complaints,  no abnormal vaginal bleeding or discharge. She is utilizing the fluconazole for her yeast vaginitis.     She denies any bowel related complaints, no fecal or flatal incontinence.     She has no other complaints.        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 She reports she did take the antibiotic which was prescribed after her most recent Botox injection treatment. Patient does have urgency and frequency bladder symptoms with a strong urine odor.      She denies any bowel related complaints, no fecal or flatal incontinence.     She has no other complaints.        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 She has stopped taking her Oxybutynin.      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     The patient presents to discuss her UDS test results, which showed that she does leak with laughing, coughing and sneezing but the leak is more associated with spasticity than the anatomy. She does continue to note bothersome urinary incontinence associated with moving, lifting. She denies any UTI like symptoms. Third line therapy options including intradetrusor Botox were discussed.     She is heading to [facility] in approximately a month.      She denies any bowel related complaints, no fecal or flatal incontinence.     She denies any vaginal complaints, no abnormal vaginal bleeding or discharge.     She has no other complaints.      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     Patient denies any vaginal complaints. She denies any abnormal vaginal bleeding or discharge.     Unfortunately she does continue to note bothersome urinary incontinence associated with moving, lifting. She denies any significant urge incontinence. She denies any nocturia.     She has no other complaints.     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     The patient presents with complaints of urinary incontinence, she states she leaks on movement and constantly wet all the time and is utilizing pads to avoid accidents. She notes leaking on laughing, cough or sneezing.She denies any UTI like symptoms.      She denies any vaginal complaints, she  continues to note some vaginal spotting but denies any abnormal vaginal discharge.      She denies any bowel related complaints, no fecal or flatal incontinence.     She has no other complaints.     From previous note  72-year-old patient presenting as a referral from Dr. Boss with complaints of urinary frequency, urgency and pelvic organ prolapse.      The patient notes a bulge, she is noting its worsening for the past two weeks. She is not sexually active but denies any vaginal complaints, no abnormal vaginal bleeding or discharge. She underwent hysterectomy due to fibroids a few years ago.      She also reports of l urinary urgency and frequency for the past few weeks, she notes 2-3 episodes of nocturia but denies enuresis. She notes daytime urgency and frequency every 1-2 hours. She has been on oxybutynin for many years, immediate release, and has noted benefits with her lower urinary tract symptoms until roughly 2 weeks ago.. She is experiencing constipation with this medication. She denies leaking on laughing, cough or sneezing. She denies any urge related incontinence complaints. She has had UTI s in the past diagnosed and treated through [facility]. She denies any history of nephrolithiasis, gross hematuria or chronic recurrent UTIs.      She has constipation and utilizes psyllium husks on an as needed basis. She denies any fecal or flatal incontinence.     She has no other complaints.  Review of Systems  Constitutional: No fever, No chills and No fatigue.   Eyes: No vision problems and No dryness of the eyes.   ENT: No dry mouth, No hearing loss and No nosebleeds.   Cardiovascular: No chest pain, No palpitations and No orthopnea.   Respiratory: No shortness of breath, No cough and No wheezing.   Gastrointestinal: No abdominal pain, No constipation, No nausea, No diarrhea, No vomiting and No melena.   Genitourinary: As noted in HPI.   Musculoskeletal: No back pain, No myalgias, No muscle weakness, No joint  swelling and No leg edema.   Integumentary: No rashes, No skin lesion and No itching.   Neurological: No headache, No numbness and No dizziness.   Psychiatric: No sleep disturbances, No anxiety and No depression.   Endocrine: No hot flashes, No loss of hair and No hirsutism.   Hematologic/Lymphatic: No swollen glands, No tendency for easy bleeding and No tendency for easy bruising.   All other systems have been reviewed and are negative for complaint.        Objective   Physical Exam    PHYSICAL EXAMINATION:  No LMP recorded. Patient is postmenopausal.  There is no height or weight on file to calculate BMI.  /83 (BP Location: Right arm)   General Appearance: well appearing  Neuro: Alert and oriented   HEENT: mucous membranes moist, neck supple  Resp: No respiratory distress, normal work of breathing  MSK: normal range of motion, gait appropriate  InterStim IPG site is clean dry intact and healing appropriately with no signs of infection.    Pelvic:  Genitourinary:  normal external genitalia, Bartholin's glands negative, Cactus's glands negative  Urethra   normal meatus, non-tender, no periurethral mass  Vaginal mucosa  normal, there is no gross abnormal vaginal discharge.  GenPath obtained.  Cervix surgically absent  Uterus surgically absent  Adnexae  negative nontender, no masses  Atrophy positive    CST negative  Pelvic floor muscle contraction  3/5    POP-Q (in supine position):  Stage 0    Rectal: no hemorrhoids, fissures or masses    The InterStim was interrogated with excellent battery life and normal impedances across all 4 leads.  The patient was noted to be on program 2 at 0.5 mV.  She felt the sensation in the rectovaginal area.  She was trialed on program 1, 3, 4, 5, and 7 but the best response she had was on program 2.  She was returned to program 2 at 0.5 mV.      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     1.  The patient did not have any significant benefits following her 100 units Botox 7/10/2023. This was complicated by a UTI on office UA 7/28/2023. She has not noted any significant benefits with the Botox after appropriate treatment of her UTI. The patient has noted significant urinary incontinence following her surgery. We have previously discussed the patient's urodynamics noting positive stress urinary incontinence with a leak point pressure of 60 cm of water but with significant detrusor overactivity and spasticity. She emptied to completion and there was no evidence of detrusor sphincter dyssynergia. We have previously discussed these findings and the mixed nature of her disease. We discussed that it appears that her urge urinary incontinence is significantly worsening her stress incontinence complaints. She has stopped her oxybutynin as this was not benefiting her lower urinary tract complaints. She has not had the opportunity to follow-up with the pelvic floor physical therapist.  The patient was again taught how to utilize her patient controller for her InterStim today and she does appear to be having significant benefits with urgency and frequency but this appears to be complicated by her vaginal complaints.     2. As above, we discussed the patient's weak and painful pelvic floor and how this relates to her lower urinary tract and vaginal complaints. She was previously provided a referral and list of pelvic floor physical therapist. She has not had the opportunity to follow-up to date.  She is under a great  deal of stress as she recently sold her house and is moving out of state.     3. We again discussed the patient's vaginal atrophy and how this affects her lower urinary tract and vaginal complaints. We discussed the safety, efficacy, proper utilization of vaginal estrogen therapy.  She will restart this now.  We discussed using clobetasol therapy to the external vaginal tissues.  She will start fluconazole therapy now.  Pending GenPath.  We discussed empirically starting on Bactrim therapy given her concerns for UTI.     4.  The patient will be contacted with her GenPath results should she require alternate therapy.  She will be contacted with the urine culture results should she require alternate therapy.  She will continue to work on her mValent device.  She is moving to [place].     ESTELA Harmon MD      Scribe Attestation  By signing my name below, I, Sarah Pratt attest that this documentation has been prepared under the direction and in the presence of Rashaad Harmon MD. All medical record entries made by the Scribe were at my direction or personally dictated by me. I have reviewed the chart and agree that the record accurately reflects my personal performance of the history, physical exam, discussion and plan

## 2023-12-01 LAB — BACTERIA UR CULT: ABNORMAL

## 2023-12-04 ENCOUNTER — APPOINTMENT (OUTPATIENT)
Dept: UROLOGY | Facility: CLINIC | Age: 73
End: 2023-12-04
Payer: MEDICARE

## 2023-12-07 DIAGNOSIS — N76.0 ACUTE VAGINITIS: Primary | ICD-10-CM

## 2023-12-07 RX ORDER — METRONIDAZOLE 7.5 MG/G
GEL TOPICAL DAILY
Qty: 70 G | Refills: 0 | Status: SHIPPED | OUTPATIENT
Start: 2023-12-07 | End: 2023-12-11 | Stop reason: SINTOL

## 2023-12-11 DIAGNOSIS — N76.0 ACUTE VAGINITIS: Primary | ICD-10-CM

## 2023-12-11 RX ORDER — METRONIDAZOLE 500 MG/1
500 TABLET ORAL 2 TIMES DAILY
Qty: 14 TABLET | Refills: 0 | Status: SHIPPED | OUTPATIENT
Start: 2023-12-11 | End: 2023-12-18

## 2023-12-15 RX ORDER — METRONIDAZOLE 500 MG/1
500 TABLET ORAL 2 TIMES DAILY
Qty: 14 TABLET | Refills: 0 | Status: SHIPPED | OUTPATIENT
Start: 2023-12-15 | End: 2023-12-19 | Stop reason: WASHOUT

## 2023-12-19 ENCOUNTER — TELEPHONE (OUTPATIENT)
Dept: UROLOGY | Facility: CLINIC | Age: 73
End: 2023-12-19

## 2023-12-19 DIAGNOSIS — N76.0 ACUTE VAGINITIS: ICD-10-CM

## 2023-12-19 DIAGNOSIS — R39.9 UTI SYMPTOMS: Primary | ICD-10-CM

## 2023-12-19 RX ORDER — PHENAZOPYRIDINE HYDROCHLORIDE 100 MG/1
100 TABLET, FILM COATED ORAL 3 TIMES DAILY PRN
Qty: 20 TABLET | Refills: 2 | Status: SHIPPED | OUTPATIENT
Start: 2023-12-19

## 2023-12-19 RX ORDER — METRONIDAZOLE 500 MG/1
500 TABLET ORAL 2 TIMES DAILY
Qty: 14 TABLET | Refills: 0 | Status: SHIPPED | OUTPATIENT
Start: 2023-12-19 | End: 2023-12-26

## 2023-12-19 RX ORDER — SULFAMETHOXAZOLE AND TRIMETHOPRIM 800; 160 MG/1; MG/1
1 TABLET ORAL 2 TIMES DAILY
Qty: 14 TABLET | Refills: 0 | Status: SHIPPED | OUTPATIENT
Start: 2023-12-19 | End: 2023-12-26

## 2023-12-19 NOTE — PROGRESS NOTES
"Spoke with patient who continues to complain of \"burning\" both of the external and internal vaginal tissues along with dysuria.  She is presently in [place].  She will be represcribed metronidazole oral therapy now.  She otherwise denies any white discharge from the vagina.  We discussed proceeding with empiric Bactrim therapy now.  She will be started on phenazopyridine to help with any dysuria complaints.  She will utilize her clobetasol therapy to the external vaginal tissues and will restart her estradiol therapy vaginally.  We discussed following up as soon as possible with urgent care or the emergency department should she have no improvement in or have any worsening complaints.  We discussed the importance of following up and establishing care in the [place] area as soon as possible.  "

## 2023-12-19 NOTE — TELEPHONE ENCOUNTER
Medication did not work. Still having burning inside and outside the vaginal area. Still having the urgency/frequency and burning. Requesting either another dose or  call from you.

## 2024-10-25 DIAGNOSIS — N39.41 URGE INCONTINENCE OF URINE: ICD-10-CM

## (undated) DEVICE — STIMULATOR, INTERSTIM TEST

## (undated) DEVICE — Device

## (undated) DEVICE — DRAPE, SHEET, 17 X 23 IN

## (undated) DEVICE — PROGRAMMER KIT, INTERSTIM X SMART, COMM HANDSET

## (undated) DEVICE — WATER STERILE, FOR IRRIGATION, 1000ML, W/HANGER

## (undated) DEVICE — DRAPE COVER, C ARM, FLOUROSCAN IMAGING SYS

## (undated) DEVICE — GLOVE, SURGICAL, PROTEXIS PI BLUE W/NEUTHERA, 7.5, PF, LF

## (undated) DEVICE — SUTURE, PROLENE, 2-0, 30 IN, CT2, BLUE

## (undated) DEVICE — SUTURE, VICRYL, 4-0, 27 IN, PS-2, UNDYED

## (undated) DEVICE — SUTURE, VICRYL, 2-0, 27 IN, SH, UNDYED

## (undated) DEVICE — APPLICATOR, CHLORAPREP, W/ORANGE TINT, 26ML

## (undated) DEVICE — SPONGE, GAUZE, RADIOPAQUE, 12 PLY, 4 X 8 IN, STERILE, LF

## (undated) DEVICE — SPONGE, GAUZE, 12 PLY, 4 X 4, STERILE, DISP

## (undated) DEVICE — PENCIL, ELECTROSURG, W/BUTTON SWITCH & HOLSTER, EZ CLEAN, DISP

## (undated) DEVICE — GOWN, ASTOUND, XL